# Patient Record
Sex: FEMALE | Race: WHITE | Employment: UNEMPLOYED | URBAN - METROPOLITAN AREA
[De-identification: names, ages, dates, MRNs, and addresses within clinical notes are randomized per-mention and may not be internally consistent; named-entity substitution may affect disease eponyms.]

---

## 2019-01-28 ENCOUNTER — HOSPITAL ENCOUNTER (EMERGENCY)
Age: 29
Discharge: HOME | End: 2019-01-28
Attending: EMERGENCY MEDICINE
Payer: SELF-PAY

## 2019-01-28 VITALS
OXYGEN SATURATION: 100 % | DIASTOLIC BLOOD PRESSURE: 84 MMHG | SYSTOLIC BLOOD PRESSURE: 131 MMHG | TEMPERATURE: 98.9 F | RESPIRATION RATE: 16 BRPM | HEART RATE: 87 BPM

## 2019-01-28 DIAGNOSIS — J02.9 ACUTE PHARYNGITIS, UNSPECIFIED ETIOLOGY: Primary | ICD-10-CM

## 2019-01-28 PROCEDURE — 87081 CULTURE SCREEN ONLY: CPT

## 2019-01-28 PROCEDURE — 74011000250 HC RX REV CODE- 250: Performed by: EMERGENCY MEDICINE

## 2019-01-28 PROCEDURE — 99283 EMERGENCY DEPT VISIT LOW MDM: CPT

## 2019-01-28 PROCEDURE — 74011636637 HC RX REV CODE- 636/637: Performed by: EMERGENCY MEDICINE

## 2019-01-28 PROCEDURE — 74011250637 HC RX REV CODE- 250/637: Performed by: EMERGENCY MEDICINE

## 2019-01-28 RX ORDER — PREDNISOLONE 15 MG/5ML
60 SOLUTION ORAL DAILY
Status: DISCONTINUED | OUTPATIENT
Start: 2019-01-28 | End: 2019-01-28

## 2019-01-28 RX ORDER — PREDNISONE 20 MG/1
60 TABLET ORAL DAILY
Qty: 15 TAB | Refills: 0 | Status: SHIPPED | OUTPATIENT
Start: 2019-01-28 | End: 2019-02-02

## 2019-01-28 RX ORDER — PREDNISOLONE 15 MG/5ML
60 SOLUTION ORAL
Status: COMPLETED | OUTPATIENT
Start: 2019-01-28 | End: 2019-01-28

## 2019-01-28 RX ADMIN — PREDNISOLONE 60 MG: 15 SOLUTION ORAL at 04:35

## 2019-01-28 RX ADMIN — LIDOCAINE HYDROCHLORIDE 50 ML: 20 SOLUTION ORAL; TOPICAL at 04:35

## 2019-01-28 NOTE — DISCHARGE INSTRUCTIONS
Sore Throat: Care Instructions  Your Care Instructions    Infection by bacteria or a virus causes most sore throats. Cigarette smoke, dry air, air pollution, allergies, and yelling can also cause a sore throat. Sore throats can be painful and annoying. Fortunately, most sore throats go away on their own. If you have a bacterial infection, your doctor may prescribe antibiotics. Follow-up care is a key part of your treatment and safety. Be sure to make and go to all appointments, and call your doctor if you are having problems. It's also a good idea to know your test results and keep a list of the medicines you take. How can you care for yourself at home? · If your doctor prescribed antibiotics, take them as directed. Do not stop taking them just because you feel better. You need to take the full course of antibiotics. · Gargle with warm salt water once an hour to help reduce swelling and relieve discomfort. Use 1 teaspoon of salt mixed in 1 cup of warm water. · Take an over-the-counter pain medicine, such as acetaminophen (Tylenol), ibuprofen (Advil, Motrin), or naproxen (Aleve). Read and follow all instructions on the label. · Be careful when taking over-the-counter cold or flu medicines and Tylenol at the same time. Many of these medicines have acetaminophen, which is Tylenol. Read the labels to make sure that you are not taking more than the recommended dose. Too much acetaminophen (Tylenol) can be harmful. · Drink plenty of fluids. Fluids may help soothe an irritated throat. Hot fluids, such as tea or soup, may help decrease throat pain. · Use over-the-counter throat lozenges to soothe pain. Regular cough drops or hard candy may also help. These should not be given to young children because of the risk of choking. · Do not smoke or allow others to smoke around you. If you need help quitting, talk to your doctor about stop-smoking programs and medicines.  These can increase your chances of quitting for good.  · Use a vaporizer or humidifier to add moisture to your bedroom. Follow the directions for cleaning the machine. When should you call for help? Call your doctor now or seek immediate medical care if:    · You have new or worse trouble swallowing.     · Your sore throat gets much worse on one side.    Watch closely for changes in your health, and be sure to contact your doctor if you do not get better as expected. Where can you learn more? Go to http://reg-redd.info/. Enter [phone] [id number] in the search box to learn more about \"Sore Throat: Care Instructions. \"  Current as of: March 27, 2018  Content Version: 11.9  © 2493-5119 [employer]. Care instructions adapted under license by [employer] (which disclaims liability or warranty for this information). If you have questions about a medical condition or this instruction, always ask your healthcare professional. [address] any warranty or liability for your use of this information.

## 2019-01-28 NOTE — ED PROVIDER NOTES
EMERGENCY DEPARTMENT HISTORY AND PHYSICAL EXAM 
 
Date: 1/28/2019 Patient Name: Bertram Kaye History of Presenting Illness Chief Complaint Patient presents with  Sore Throat History Provided By: Patient Chief Complaint: Sore Throat Duration: 3 Days Timing:  Acute Location: Throat Quality: Sore Severity: Mild Modifying Factors: No modifying factors Associated Symptoms: Congestion Additional History (Context):  
4:16 AM 
Bertram Kaye is a 33 y.o. female with PMHX of Hypothyroidism who presents to the emergency department C/O sore throat onset 3 days ago. Associated sxs include congestion. Pt endorses sick contact. Pt states that her sxs started with the sore throat. Pt states she has a hx of sinus congestion. Pt denies postnasal drip, fever, EtOH use, tobacco use, illicit drug use, chills hx of asthma, and any other sxs or complaints. PCP: None Past History Past Medical History: 
Past Medical History:  
Diagnosis Date  Hypothyroidism Past Surgical History: 
History reviewed. No pertinent surgical history. Family History: 
History reviewed. No pertinent family history. Social History: 
Social History Tobacco Use  Smoking status: Never Smoker Substance Use Topics  Alcohol use: No  
  Frequency: Never  Drug use: Not on file Allergies: 
No Known Allergies Review of Systems Review of Systems Constitutional: Negative for chills and fever. HENT: Positive for congestion and sore throat. Negative for postnasal drip. All other systems reviewed and are negative. Physical Exam  
 
Vitals:  
 01/28/19 0334 BP: 131/84 Pulse: 87 Resp: 16 Temp: 98.9 °F (37.2 °C) SpO2: 100% Physical Exam  
Constitutional: She is oriented to person, place, and time. She appears well-developed and well-nourished. No distress. Well appearing nontoxic HENT:  
Head: Normocephalic and atraumatic.   
Right Ear: External ear normal.  
 Left Ear: External ear normal.  
Mouth/Throat: Oropharynx is clear and moist. No oropharyngeal exudate. Sinuses erythema and edema No purulence No throat erythema No exudate Eyes: Conjunctivae and EOM are normal. Pupils are equal, round, and reactive to light. No scleral icterus. No pallor Neck: Normal range of motion. Neck supple. No JVD present. No tracheal deviation present. No thyromegaly present. Cardiovascular: Normal rate, regular rhythm and normal heart sounds. Pulmonary/Chest: Effort normal and breath sounds normal. No stridor. No respiratory distress. Abdominal: Soft. Bowel sounds are normal. She exhibits no distension. There is no tenderness. There is no rebound and no guarding. Musculoskeletal: Normal range of motion. She exhibits no edema or tenderness. No soft tissue injuries Lymphadenopathy:  
  She has no cervical adenopathy. Neurological: She is alert and oriented to person, place, and time. She has normal reflexes. No cranial nerve deficit. Coordination normal.  
Skin: Skin is warm and dry. No rash noted. She is not diaphoretic. No erythema. Psychiatric: She has a normal mood and affect. Her behavior is normal. Judgment and thought content normal.  
Nursing note and vitals reviewed. Diagnostic Study Results Labs - Recent Results (from the past 12 hour(s)) STREP THROAT SCREEN Collection Time: 01/28/19  3:37 AM  
Result Value Ref Range Special Requests: NO SPECIAL REQUESTS Strep Screen NEGATIVE Strep Screen (NOTE) RAPID PERFORMED BY [id number] Culture result: PENDING Radiologic Studies - No orders to display CT Results  (Last 48 hours) None CXR Results  (Last 48 hours) None Medications given in the ED- Medications  
prednisoLONE (PRELONE) syrup 60 mg (not administered)  
mylanta/viscous lidocaine (GI COCKTAIL) (not administered) Medical Decision Making I am the first provider for this patient. I reviewed the vital signs, available nursing notes, past medical history, past surgical history, family history and social history. Vital Signs-Reviewed the patient's vital signs. Pulse Oximetry Analysis - 100% on RA Records Reviewed: Nursing Notes and Old Medical Records Provider Notes (Medical Decision Making): Ddx: pharyngitis without signs of strep epiglottitis, or other airway compromise. Will treat sxs with steroids, GI cocktail, recommend zinc and follow up as needed. Procedures: 
Procedures ED Course:  
4:16 AM Initial assessment performed. The patients presenting problems have been discussed, and they are in agreement with the care plan formulated and outlined with them. I have encouraged them to ask questions as they arise throughout their visit. Diagnosis and Disposition DISCHARGE NOTE: 
 
Maribel Ashger  results have been reviewed with her. She has been counseled regarding her diagnosis, treatment, and plan. She verbally conveys understanding and agreement of the signs, symptoms, diagnosis, treatment and prognosis and additionally agrees to follow up as discussed. She also agrees with the care-plan and conveys that all of her questions have been answered. I have also provided discharge instructions for her that include: educational information regarding their diagnosis and treatment, and list of reasons why they would want to return to the ED prior to their follow-up appointment, should her condition change. She has been provided with education for proper emergency department utilization. CLINICAL IMPRESSION: 
 
1. Acute pharyngitis, unspecified etiology PLAN: 
1. D/C Home 2. Current Discharge Medication List  
  
START taking these medications Details  
magic mouthwash (PHILL) susp Take 5 mL by mouth every four (4) hours as needed. Maalox, benadryl, lidocaine - NO NYSTATIN Qty: 120 mL, Refills: 0 predniSONE (DELTASONE) 20 mg tablet Take 60 mg by mouth daily for 5 days. With Breakfast 
Qty: 15 Tab, Refills: 0  
  
vit a-vit c-zinc-propolis (ZINC, WITH A AND C, LOZENGES) lozg Use lozenges as needed 
Qty: 30 Lozenge, Refills: 0  
  
  
 
3. Follow-up Information Follow up With Specialties Details Why Contact Info [facility]  Schedule an appointment as soon as possible for a visit For primary care follow up  [address] [place], [address] [place]  [address] 
[phone] [facility] EMERGENCY DEPT Emergency Medicine Go to As needed, if symptoms worsen [address] [place]  [address] 
[phone]  
  
 
_______________________________ Attestations: This note is prepared by Justa Moore, acting as Scribe for Atul. Zev Baires MD. Atul. Zev Baires MD:  The scribe's documentation has been prepared under my direction and personally reviewed by me in its entirety. I confirm that the note above accurately reflects all work, treatment, procedures, and medical decision making performed by me. 
_______________________________

## 2019-01-30 LAB
B-HEM STREP THROAT QL CULT: NEGATIVE
B-HEM STREP THROAT QL CULT: NORMAL
BACTERIA SPEC CULT: NORMAL
SERVICE CMNT-IMP: NORMAL

## 2019-05-13 ENCOUNTER — HOSPITAL ENCOUNTER (OUTPATIENT)
Dept: WOUND CARE | Age: 29
Discharge: HOME | End: 2019-05-13
Attending: FAMILY MEDICINE
Payer: COMMERCIAL

## 2019-05-13 PROBLEM — T81.31XA DEHISCENCE OF EXTERNAL SURGICAL WOUND: Status: ACTIVE | Noted: 2019-05-13

## 2019-05-13 PROBLEM — T81.31XA: Status: ACTIVE | Noted: 2019-05-13

## 2019-05-13 PROBLEM — Z98.890 STATUS POST ABDOMINOPLASTY: Status: ACTIVE | Noted: 2019-05-13

## 2019-05-13 PROCEDURE — 87186 SC STD MICRODIL/AGAR DIL: CPT

## 2019-05-13 PROCEDURE — 87070 CULTURE OTHR SPECIMN AEROBIC: CPT

## 2019-05-13 PROCEDURE — 11045 DBRDMT SUBQ TISS EACH ADDL: CPT

## 2019-05-13 PROCEDURE — 87075 CULTR BACTERIA EXCEPT BLOOD: CPT

## 2019-05-13 PROCEDURE — 11042 DBRDMT SUBQ TIS 1ST 20SQCM/<: CPT

## 2019-05-13 PROCEDURE — 87077 CULTURE AEROBIC IDENTIFY: CPT

## 2019-05-13 NOTE — PROGRESS NOTES
Patient presents to wound clinic for: Beverly Blackwell is a 33 y.o. female who presents initial consult for wound care. Pt had a tummy tuck done in the [facility] in April. Postoperatively she was doing well until about 3 weeks ago when the incision started to breakdown. 2 weeks ago she went to [facility] and was started on Bactrim and Keflex and she has finished that. The wound continues to break down and drains. She is using medical honey to assist in keeping it clean. She denies pain. Pertinent Medical History:  Past Medical History:   Diagnosis Date    Hypothyroidism      No past surgical history on file. Prior to Admission medications    Medication Sig Start Date End Date Taking? Authorizing Provider   magic mouthwash (PHILL) susp Take 5 mL by mouth every four (4) hours as needed. Maalox, benadryl, lidocaine - NO NYSTATIN 1/28/19   Jocelyn Cohen MD   vit a-vit c-zinc-propolis (ZINC, WITH A AND C, LOZENGES) lozg Use lozenges as needed 1/28/19   Jocelyn Cohen MD     No Known Allergies  No family history on file.   Social History     Socioeconomic History    Marital status:      Spouse name: Not on file    Number of children: Not on file    Years of education: Not on file    Highest education level: Not on file   Occupational History    Not on file   Social Needs    Financial resource strain: Not on file    Food insecurity:     Worry: Not on file     Inability: Not on file    Transportation needs:     Medical: Not on file     Non-medical: Not on file   Tobacco Use    Smoking status: Never Smoker   Substance and Sexual Activity    Alcohol use: No     Frequency: Never    Drug use: Not on file    Sexual activity: Not on file   Lifestyle    Physical activity:     Days per week: Not on file     Minutes per session: Not on file    Stress: Not on file   Relationships    Social connections:     Talks on phone: Not on file     Gets together: Not on file     Attends [employer] service: Not on file     Active member of club or organization: Not on file     Attends meetings of clubs or organizations: Not on file     Relationship status: Not on file    Intimate partner violence:     Fear of current or ex partner: Not on file     Emotionally abused: Not on file     Physically abused: Not on file     Forced sexual activity: Not on file   Other Topics Concern    Not on file   Social History Narrative    Not on file     Review of Systems:    Gen: No fever, chills, malaise, weight loss/gain. Heent: No headache, rhinorrhea, epistaxis, ear pain, hearing loss, sinus pain, neck pain/stiffness, sore throat. Heart: No chest pain, palpitations, RAZA, pnd, or orthopnea. Resp: No cough, hemoptysis, wheezing and shortness of breath. GI: No nausea, vomiting, diarrhea, constipation, melena or hematochezia. : No urinary obstruction, dysuria or hematuria. Derm: see below  Musc/skeletal: no bone or joint complains. Vasc: No edema, cyanosis or claudication. Endo: No heat/cold intolerance, no polyuria,polydipsia or polyphagia. Neuro: No unilateral weakness, numbness, tingling. No seizures. Heme: No easy bruising or bleeding. Exam:    Vitals:    05/13/19 1409   BP: 132/89   Pulse: 79   Resp: 15   Temp: 98.4 °F (36.9 °C)   SpO2: 98%   Weight: 99.8 kg (220 lb)   Height: 5' 6\" (1.676 m)     Gen Overweight, pleasant female in NAD. CV RRR  Lungs CTA bilaterally. Neuro: A+Ox4, Gross motor intact. Psych: Pleasant, good hygiene, no anxiety or depression.       Wound Description and Procedure Note:  05/14/19 1521    Wound Abdomen   Date First Assessed/Time First Assessed: 05/14/19 1519   Present on Hospital Admission: Yes  Wound Approximate Age at First Assessment (Weeks): 4 weeks  Primary Wound Type: Open incision/surgical site  Location: Abdomen   Dressing Status Breakthrough drainage   Dressing Type ABD binder;ABD pad   Incision Site Well Approximated No   Non-staged Wound Description Full thickness   Shape irregular   Wound Length (cm) 20 cm   Wound Width (cm) 6 cm   Wound Depth (cm) 0.5 cm   Wound Volume (cm^3) 60 cm^3   Condition of Base Slough;Granulation; Adipose exposed   Condition of Edges Open   Assessment Painful   Tissue Type Percent Red 30   Tissue Type Percent Yellow 70   Drainage Amount Large   Drainage Color Serosanguinous   Wound Odor None   Samantha-wound Assessment Induration   Margins Epibole (rolled edges)   Cleansing and Cleansing Agents  Dermal wound cleanser   Dressing Changed Changed/New   Dressing Type Applied Negative pressure wound therapy   Wound Procedure Type Remove Slough   Procedure Time Out 1445   Consent Obtained  Yes   Procedure Bleeding Moderate   Procedure Hemostasis  Pressure   Type of Tissue Removed  Necrotic Tissue /Eschar;Devitalized   Procedure Instrument  Curette;Scissors; Forceps   Procedure Pain Scale Numeric 5/10   Debridement Procedure Performed by Dr. Cookie Mack   Post-Procedure Length (cm) 7 cm   Post-Procedure Width (cm) 21 cm   Post-Procedure Depth (cm) 0.7 cm   Post-Procedure Volume (cm^3) 102.9 cm^3   Post-Procedure Surface Area (cm^2) 147 cm^2   Closure None   Procedure Tolerated Well     Tissue Removed: exudate, dermis, epidermis, subcutaneous     Debridement: required today to promote wound healing. Assessment:  Patient Active Problem List   Diagnosis Code    Status post abdominoplasty Z98.890    Dehiscence of closure of subcutaneous tissue, initial encounter T81.31XA    Dehiscence of external surgical wound T81. 31XA         Plan:  1. Debridement as required. 2. Wound vacuum application. 3. Wound cultures. 4. RTC in 1 week.     Jorge Floyd MD

## 2019-05-14 VITALS
OXYGEN SATURATION: 98 % | HEIGHT: 66 IN | TEMPERATURE: 98.4 F | RESPIRATION RATE: 15 BRPM | BODY MASS INDEX: 35.36 KG/M2 | WEIGHT: 220 LBS | SYSTOLIC BLOOD PRESSURE: 132 MMHG | HEART RATE: 79 BPM | DIASTOLIC BLOOD PRESSURE: 89 MMHG

## 2019-05-14 NOTE — DISCHARGE INSTRUCTIONS
Leave dressings in place until next visit    Patient to return for wound care in:  Days    PLEASE CONTACT OFFICE AS SOON AS POSSIBLE IF UNABLE TO MAKE THIS APPOINTMENT. Inspect your wounds, looking for signs of infection which may include the following:  Increase in redness  Red \"streaks\" from wound  Increase in swelling  Fever  Unusual odor  Change in the amount of wound drainage. Should you experience any significant changes in your wound(s) or have any questions regarding your home care instructions please contact the wound center or your home health company. If after regular business hours, please call your family doctor or local emergency room. Edema Control: Elevate legs as much as possible. Avoid standing in one position for more than 10 minutes. Avoid setting with legs down. Do not cross legs while sitting. Off-Loading:Frequent position changes. Do not cross legs while sitting. Shift weight every 20 minutes or more when sitting for prolonged periods of time.

## 2019-05-14 NOTE — WOUND CARE
05/14/19 1521 Wound Abdomen Date First Assessed/Time First Assessed: 05/14/19 1519   Present on Hospital Admission: Yes  Wound Approximate Age at First Assessment (Weeks): 4 weeks  Primary Wound Type: Open incision/surgical site  Location: Abdomen Dressing Status Breakthrough drainage Dressing Type ABD binder;ABD pad Incision Site Well Approximated No  
Non-staged Wound Description Full thickness Shape irregular Wound Length (cm) 20 cm Wound Width (cm) 6 cm Wound Depth (cm) 0.5 cm Wound Volume (cm^3) 60 cm^3 Condition of Base Slough;Granulation; Adipose exposed Condition of Edges Open Assessment Painful Tissue Type Percent Red 30 Tissue Type Percent Yellow 70 Drainage Amount Large Drainage Color Serosanguinous Wound Odor None Samantha-wound Assessment Induration Margins Epibole (rolled edges) Cleansing and Cleansing Agents  Dermal wound cleanser Dressing Changed Changed/New Dressing Type Applied Negative pressure wound therapy Wound Procedure Type Remove [facility] Procedure Time Out 0198 Consent Obtained  Yes Procedure Bleeding Moderate Procedure Hemostasis  Pressure Type of Tissue Removed  Necrotic Tissue /Eschar;Devitalized Procedure Instrument  Curette;Scissors; Forceps Procedure Pain Scale Numeric 5/10 Debridement Procedure Performed by Dr. Emily Bedoya Post-Procedure Length (cm) 7 cm Post-Procedure Width (cm) 21 cm Post-Procedure Depth (cm) 0.7 cm Post-Procedure Volume (cm^3) 102.9 cm^3 Post-Procedure Surface Area (cm^2) 147 cm^2 Closure None Procedure Tolerated Well

## 2019-05-15 LAB
BACTERIA SPEC CULT: ABNORMAL
GRAM STN SPEC: ABNORMAL
SERVICE CMNT-IMP: ABNORMAL
SERVICE CMNT-IMP: ABNORMAL

## 2019-05-16 ENCOUNTER — HOSPITAL ENCOUNTER (OUTPATIENT)
Dept: WOUND CARE | Age: 29
Discharge: HOME | End: 2019-05-16
Attending: FAMILY MEDICINE
Payer: COMMERCIAL

## 2019-05-16 VITALS
OXYGEN SATURATION: 100 % | TEMPERATURE: 98.1 F | DIASTOLIC BLOOD PRESSURE: 81 MMHG | RESPIRATION RATE: 16 BRPM | SYSTOLIC BLOOD PRESSURE: 129 MMHG | HEART RATE: 99 BPM

## 2019-05-16 PROCEDURE — 99211 OFF/OP EST MAY X REQ PHY/QHP: CPT

## 2019-05-16 RX ORDER — SULFAMETHOXAZOLE AND TRIMETHOPRIM 800; 160 MG/1; MG/1
2 TABLET ORAL 2 TIMES DAILY
Qty: 40 TAB | Refills: 0 | Status: SHIPPED | OUTPATIENT
Start: 2019-05-16 | End: 2019-05-23

## 2019-05-16 RX ORDER — AMOXICILLIN AND CLAVULANATE POTASSIUM 875; 125 MG/1; MG/1
1 TABLET, FILM COATED ORAL 2 TIMES DAILY
Qty: 20 TAB | Refills: 0 | Status: SHIPPED | OUTPATIENT
Start: 2019-05-16 | End: 2019-05-23

## 2019-05-16 NOTE — PROGRESS NOTES
Patient presents to wound clinic for: Ginger Buchanan is a 33 y.o. female whom presents for change of the wound vac. She has no complaints today. Review of Systems: 
 
Gen: No fever, chills, malaise, weight loss/gain. GI: No nausea, vomiting, diarrhea, constipation, melena or hematochezia. Derm: see below Musc/skeletal: no bone or joint complains. Vasc: No edema, cyanosis or claudication. Exam:  
Gen: Pleasant. No obvious distress. Wound vac in place. Good drainage. Wound Description: Not measured today due to wound vac in place. Infection: Yes Type: MRSA, MADIE Grover Signs and Symptoms: Skin breakdowna Recent Would Culture results: as above Antibiotic: 
 Oral: start Name: Bactrim and Augmentin Assessment: 
 
Patient Active Problem List  
Diagnosis Code  Status post abdominoplasty Z98.890  
 Dehiscence of closure of subcutaneous tissue, initial encounter T81.31XA  Dehiscence of external surgical wound T81. 31XA MRSA in the wound. Plan: 1. Continue wound vac until Monday. 2. Start Bactrim and Augmentin for positive wound cultures. 3. Debridement on Monday.  
 
Zandra Pozo MD

## 2019-05-17 ENCOUNTER — APPOINTMENT (OUTPATIENT)
Dept: GENERAL RADIOLOGY | Age: 29
DRG: 862 | End: 2019-05-17
Attending: EMERGENCY MEDICINE
Payer: COMMERCIAL

## 2019-05-17 ENCOUNTER — HOSPITAL ENCOUNTER (INPATIENT)
Age: 29
LOS: 6 days | Discharge: HOME | DRG: 862 | End: 2019-05-23
Attending: EMERGENCY MEDICINE | Admitting: FAMILY MEDICINE
Payer: COMMERCIAL

## 2019-05-17 DIAGNOSIS — T14.8XXA INFECTED WOUND: Primary | ICD-10-CM

## 2019-05-17 DIAGNOSIS — L08.9 INFECTED WOUND: Primary | ICD-10-CM

## 2019-05-17 DIAGNOSIS — R65.10 SIRS (SYSTEMIC INFLAMMATORY RESPONSE SYNDROME) (HCC): ICD-10-CM

## 2019-05-17 DIAGNOSIS — A49.02 MRSA INFECTION: ICD-10-CM

## 2019-05-17 LAB
ALBUMIN SERPL-MCNC: 3.7 G/DL (ref 3.4–5)
ALBUMIN/GLOB SERPL: 0.9 {RATIO} (ref 0.8–1.7)
ALP SERPL-CCNC: 85 U/L (ref 45–117)
ALT SERPL-CCNC: 58 U/L (ref 13–56)
ANION GAP SERPL CALC-SCNC: 9 MMOL/L (ref 3–18)
APPEARANCE UR: ABNORMAL
AST SERPL-CCNC: 35 U/L (ref 15–37)
BACTERIA URNS QL MICRO: ABNORMAL /HPF
BASOPHILS # BLD: 0 K/UL (ref 0–0.1)
BASOPHILS NFR BLD: 0 % (ref 0–2)
BILIRUB SERPL-MCNC: 0.8 MG/DL (ref 0.2–1)
BILIRUB UR QL: ABNORMAL
BUN SERPL-MCNC: 8 MG/DL (ref 7–18)
BUN/CREAT SERPL: 8 (ref 12–20)
CALCIUM SERPL-MCNC: 9.3 MG/DL (ref 8.5–10.1)
CHLORIDE SERPL-SCNC: 99 MMOL/L (ref 100–108)
CO2 SERPL-SCNC: 26 MMOL/L (ref 21–32)
COLOR UR: ABNORMAL
CREAT SERPL-MCNC: 1.02 MG/DL (ref 0.6–1.3)
DIFFERENTIAL METHOD BLD: ABNORMAL
EOSINOPHIL # BLD: 0.2 K/UL (ref 0–0.4)
EOSINOPHIL NFR BLD: 2 % (ref 0–5)
EPITH CASTS URNS QL MICRO: ABNORMAL /LPF (ref 0–5)
ERYTHROCYTE [DISTWIDTH] IN BLOOD BY AUTOMATED COUNT: 12.8 % (ref 11.6–14.5)
GLOBULIN SER CALC-MCNC: 4.1 G/DL (ref 2–4)
GLUCOSE SERPL-MCNC: 118 MG/DL (ref 74–99)
GLUCOSE UR STRIP.AUTO-MCNC: NEGATIVE MG/DL
HCT VFR BLD AUTO: 40.7 % (ref 35–45)
HGB BLD-MCNC: 13.1 G/DL (ref 12–16)
HGB UR QL STRIP: NEGATIVE
KETONES UR QL STRIP.AUTO: ABNORMAL MG/DL
LACTATE BLD-SCNC: 0.73 MMOL/L (ref 0.4–2)
LEUKOCYTE ESTERASE UR QL STRIP.AUTO: ABNORMAL
LYMPHOCYTES # BLD: 1 K/UL (ref 0.9–3.6)
LYMPHOCYTES NFR BLD: 7 % (ref 21–52)
MCH RBC QN AUTO: 26.6 PG (ref 24–34)
MCHC RBC AUTO-ENTMCNC: 32.2 G/DL (ref 31–37)
MCV RBC AUTO: 82.7 FL (ref 74–97)
MONOCYTES # BLD: 0.7 K/UL (ref 0.05–1.2)
MONOCYTES NFR BLD: 5 % (ref 3–10)
NEUTS SEG # BLD: 12.3 K/UL (ref 1.8–8)
NEUTS SEG NFR BLD: 86 % (ref 40–73)
NITRITE UR QL STRIP.AUTO: NEGATIVE
PH UR STRIP: 5.5 [PH] (ref 5–8)
PLATELET # BLD AUTO: 481 K/UL (ref 135–420)
PMV BLD AUTO: 9.3 FL (ref 9.2–11.8)
POTASSIUM SERPL-SCNC: 3.7 MMOL/L (ref 3.5–5.5)
PROT SERPL-MCNC: 7.8 G/DL (ref 6.4–8.2)
PROT UR STRIP-MCNC: NEGATIVE MG/DL
RBC # BLD AUTO: 4.92 M/UL (ref 4.2–5.3)
RBC #/AREA URNS HPF: NEGATIVE /HPF (ref 0–5)
SODIUM SERPL-SCNC: 134 MMOL/L (ref 136–145)
SP GR UR REFRACTOMETRY: 1.03 (ref 1–1.03)
URATE CRY URNS QL MICRO: ABNORMAL
UROBILINOGEN UR QL STRIP.AUTO: 1 EU/DL (ref 0.2–1)
WBC # BLD AUTO: 14.2 K/UL (ref 4.6–13.2)
WBC URNS QL MICRO: ABNORMAL /HPF (ref 0–5)

## 2019-05-17 PROCEDURE — 65270000029 HC RM PRIVATE

## 2019-05-17 PROCEDURE — 74011250637 HC RX REV CODE- 250/637: Performed by: EMERGENCY MEDICINE

## 2019-05-17 PROCEDURE — 93005 ELECTROCARDIOGRAM TRACING: CPT

## 2019-05-17 PROCEDURE — 74011250636 HC RX REV CODE- 250/636: Performed by: EMERGENCY MEDICINE

## 2019-05-17 PROCEDURE — 80053 COMPREHEN METABOLIC PANEL: CPT

## 2019-05-17 PROCEDURE — 96366 THER/PROPH/DIAG IV INF ADDON: CPT

## 2019-05-17 PROCEDURE — 85025 COMPLETE CBC W/AUTO DIFF WBC: CPT

## 2019-05-17 PROCEDURE — 83605 ASSAY OF LACTIC ACID: CPT

## 2019-05-17 PROCEDURE — 71045 X-RAY EXAM CHEST 1 VIEW: CPT

## 2019-05-17 PROCEDURE — 96375 TX/PRO/DX INJ NEW DRUG ADDON: CPT

## 2019-05-17 PROCEDURE — 99285 EMERGENCY DEPT VISIT HI MDM: CPT

## 2019-05-17 PROCEDURE — 87040 BLOOD CULTURE FOR BACTERIA: CPT

## 2019-05-17 PROCEDURE — 81001 URINALYSIS AUTO W/SCOPE: CPT

## 2019-05-17 PROCEDURE — 77030032490 HC SLV COMPR SCD KNE COVD -B

## 2019-05-17 PROCEDURE — 96365 THER/PROPH/DIAG IV INF INIT: CPT

## 2019-05-17 RX ORDER — ONDANSETRON 2 MG/ML
4 INJECTION INTRAMUSCULAR; INTRAVENOUS
Status: COMPLETED | OUTPATIENT
Start: 2019-05-17 | End: 2019-05-17

## 2019-05-17 RX ORDER — VANCOMYCIN/0.9 % SOD CHLORIDE 1.5G/250ML
1500 PLASTIC BAG, INJECTION (ML) INTRAVENOUS ONCE
Status: COMPLETED | OUTPATIENT
Start: 2019-05-17 | End: 2019-05-17

## 2019-05-17 RX ORDER — ACETAMINOPHEN 325 MG/1
650 TABLET ORAL
Status: COMPLETED | OUTPATIENT
Start: 2019-05-17 | End: 2019-05-17

## 2019-05-17 RX ORDER — HEPARIN SODIUM 5000 [USP'U]/ML
5000 INJECTION, SOLUTION INTRAVENOUS; SUBCUTANEOUS EVERY 8 HOURS
Status: DISCONTINUED | OUTPATIENT
Start: 2019-05-17 | End: 2019-05-19

## 2019-05-17 RX ORDER — MORPHINE SULFATE 4 MG/ML
4 INJECTION INTRAVENOUS
Status: COMPLETED | OUTPATIENT
Start: 2019-05-17 | End: 2019-05-17

## 2019-05-17 RX ORDER — VANCOMYCIN/0.9 % SOD CHLORIDE 1.5G/250ML
1500 PLASTIC BAG, INJECTION (ML) INTRAVENOUS EVERY 12 HOURS
Status: DISCONTINUED | OUTPATIENT
Start: 2019-05-18 | End: 2019-05-20

## 2019-05-17 RX ORDER — SODIUM CHLORIDE 9 MG/ML
125 INJECTION, SOLUTION INTRAVENOUS CONTINUOUS
Status: DISCONTINUED | OUTPATIENT
Start: 2019-05-17 | End: 2019-05-22

## 2019-05-17 RX ADMIN — MORPHINE SULFATE 4 MG: 4 INJECTION INTRAVENOUS at 20:35

## 2019-05-17 RX ADMIN — ACETAMINOPHEN 650 MG: 325 TABLET ORAL at 21:07

## 2019-05-17 RX ADMIN — SODIUM CHLORIDE 1000 ML: 900 INJECTION, SOLUTION INTRAVENOUS at 20:00

## 2019-05-17 RX ADMIN — VANCOMYCIN HYDROCHLORIDE 1500 MG: 10 INJECTION, POWDER, LYOPHILIZED, FOR SOLUTION INTRAVENOUS at 20:28

## 2019-05-17 RX ADMIN — ONDANSETRON 4 MG: 2 INJECTION INTRAMUSCULAR; INTRAVENOUS at 20:29

## 2019-05-17 NOTE — ED PROVIDER NOTES
EMERGENCY DEPARTMENT HISTORY AND PHYSICAL EXAM 
 
Date: 5/17/2019 Patient Name: Tamara Barrios History of Presenting Illness Chief Complaint Patient presents with  Abscess  Generalized Body Aches HPI:  
7:28 PM 
Tamara Barrios is a 33 y.o. female with PMHX of thyroid disease, who presents to the emergency department C/O chills, body aches, and drainage from abdomen wound. Patient states she had a tummy tack, and umbilical hernia repair in [place] for fall 2019. She was seen by wound care and had wound VAC applied about a week ago. Last few days she developed small abscess on her vaginal area. Today she began having chills and generalized aching and she noted more drainage from the wound VAC. She has had similar abscesses like in a vaginal area multiple times in the past.  She also admits to some cough and chest congestion for about a week. .. Pt denies headache, chest or abdomen pain, and any other sxs or complaints. PCP: None Current Facility-Administered Medications Medication Dose Route Frequency Provider Last Rate Last Dose  acetaminophen (TYLENOL) tablet 650 mg  650 mg Oral Q6H PRN Yusra Terry MD   650 mg at 05/18/19 2106  lidocaine 4 % patch 1 Patch  1 Patch TransDERmal Q24H Yusra Terry MD   1 Patch at 05/19/19 0629  
 butalbital-acetaminophen-caffeine (FIORICET, ESGIC) -40 mg per tablet 2 Tab  2 Tab Oral Q6H PRN Chelo Alexander MD   2 Tab at 05/18/19 1245  traMADol (ULTRAM) tablet 50 mg  50 mg Oral Q6H PRN Yusra Terry MD   50 mg at 05/19/19 0928  
 vancomycin (VANCOCIN) 1500 mg in  ml infusion  1,500 mg IntraVENous Q12H Yusra Terry  mL/hr at 05/19/19 0834 1,500 mg at 05/19/19 9278  levothyroxine (SYNTHROID) tablet 87.5 mcg  87.5 mcg Oral Farheen BENNETT MD   87.5 mcg at 05/19/19 0617  
 0.9% sodium chloride infusion  125 mL/hr IntraVENous CONTINUOUS Jez Arias  mL/hr at 05/19/19 0726 125 mL/hr at 05/19/19 0180  Vancomycin- pharmacy to dose  1 Each Other Rx Dosing/Monitoring Jez Arias MD      
 
 
Past History Past Medical History: 
Past Medical History:  
Diagnosis Date  Hypothyroidism Past Surgical History: 
Past Surgical History:  
Procedure Laterality Date  HX HERNIA REPAIR Family History: 
History reviewed. No pertinent family history. Social History: 
Social History Tobacco Use  Smoking status: Never Smoker  Smokeless tobacco: Never Used Substance Use Topics  Alcohol use: No  
  Frequency: Never  Drug use: Not Currently Allergies: 
No Known Allergies Review of Systems Review of Systems Constitutional: Positive for chills. Genitourinary: Positive for genital sores. Skin: Positive for wound. Physical Exam  
 
Vitals:  
 05/19/19 5207 05/19/19 6960 05/19/19 0720 05/19/19 1028 BP: 113/71 124/71 122/74 122/76 Pulse: 64 75 79 72 Resp: 14  15 16 Temp: 98.4 °F (36.9 °C)  98.5 °F (36.9 °C) 98.6 °F (37 °C) SpO2: 95%  100% 99% Weight:      
Height:      
 
Physical Exam  
Constitutional: She is oriented to person, place, and time. She appears well-developed and well-nourished. No distress. Obese female in no distress. HENT:  
Head: Normocephalic and atraumatic. Right Ear: External ear normal. No swelling or tenderness. Tympanic membrane is not perforated, not erythematous and not bulging. Left Ear: External ear normal. No swelling or tenderness. Tympanic membrane is not perforated, not erythematous and not bulging. Nose: Nose normal. No mucosal edema or rhinorrhea. Right sinus exhibits no maxillary sinus tenderness and no frontal sinus tenderness. Left sinus exhibits no maxillary sinus tenderness and no frontal sinus tenderness.   
Mouth/Throat: Uvula is midline, oropharynx is clear and moist and mucous membranes are normal. No oral lesions. No trismus in the jaw. No dental abscesses or uvula swelling. No oropharyngeal exudate, posterior oropharyngeal edema, posterior oropharyngeal erythema or tonsillar abscesses. Eyes: Conjunctivae are normal. Right eye exhibits no discharge. Left eye exhibits no discharge. No scleral icterus. Neck: Normal range of motion. Neck supple. Cardiovascular: Normal rate, regular rhythm, normal heart sounds and intact distal pulses. Exam reveals no gallop and no friction rub. No murmur heard. Pulmonary/Chest: Effort normal and breath sounds normal. No accessory muscle usage. No tachypnea. No respiratory distress. She has no decreased breath sounds. She has no wheezes. She has no rhonchi. She has no rales. Abdominal: Soft. She exhibits no distension. There is no tenderness. There is a wound VAC mid suprapubic area. The wound VAC appears intact but there is a small amount of serosanguineous drainage distally to the wound VAC. Rest of the abdomen appears intact. There is no obvious induration or tenderness. Genitourinary: Vagina normal.  
Genitourinary Comments: There is a small tender firm furuncle distal right vulvar area. Musculoskeletal: Normal range of motion. She exhibits no edema or tenderness. Lymphadenopathy:  
  She has no cervical adenopathy. Neurological: She is alert and oriented to person, place, and time. No cranial nerve deficit. Coordination normal.  
Skin: Skin is warm and dry. No rash noted. She is not diaphoretic. No erythema. Psychiatric: She has a normal mood and affect. Judgment normal.  
Nursing note and vitals reviewed. Diagnostic Study Results Labs - Recent Results (from the past 12 hour(s)) CBC W/O DIFF Collection Time: 05/19/19  3:45 AM  
Result Value Ref Range WBC 7.1 4.6 - 13.2 K/uL  
 RBC 4.09 (L) 4.20 - 5.30 M/uL  
 HGB 10.9 (L) 12.0 - 16.0 g/dL HCT 34.4 (L) 35.0 - 45.0 %  MCV 84.1 74.0 - 97.0 FL  
 MCH 26.7 24.0 - 34.0 PG  
 MCHC 31.7 31.0 - 37.0 g/dL  
 RDW 12.5 11.6 - 14.5 % PLATELET 240 393 - 113 K/uL MPV 10.0 9.2 - 50.8 FL  
METABOLIC PANEL, COMPREHENSIVE Collection Time: 05/19/19  3:45 AM  
Result Value Ref Range Sodium 140 136 - 145 mmol/L Potassium 3.7 3.5 - 5.5 mmol/L Chloride 109 (H) 100 - 108 mmol/L  
 CO2 24 21 - 32 mmol/L Anion gap 7 3.0 - 18 mmol/L Glucose 77 74 - 99 mg/dL BUN 5 (L) 7.0 - 18 MG/DL Creatinine 0.55 (L) 0.6 - 1.3 MG/DL  
 BUN/Creatinine ratio 9 (L) 12 - 20 GFR est AA >60 >60 ml/min/1.73m2 GFR est non-AA >60 >60 ml/min/1.73m2 Calcium 8.4 (L) 8.5 - 10.1 MG/DL Bilirubin, total 0.2 0.2 - 1.0 MG/DL  
 ALT (SGPT) 64 (H) 13 - 56 U/L  
 AST (SGOT) 35 15 - 37 U/L Alk. phosphatase 73 45 - 117 U/L Protein, total 5.8 (L) 6.4 - 8.2 g/dL Albumin 2.8 (L) 3.4 - 5.0 g/dL Globulin 3.0 2.0 - 4.0 g/dL A-G Ratio 0.9 0.8 - 1.7 Radiologic Studies -  
XR CHEST PORT Final Result IMPRESSION:  
  
No acute findings in the chest.   
  
 
CT Results  (Last 48 hours) None CXR Results  (Last 48 hours) 05/17/19 2024  XR CHEST PORT Final result Impression:  IMPRESSION:  
   
No acute findings in the chest.   
  
 Narrative:  EXAM: XR CHEST PORT  
   
CLINICAL INDICATION/HISTORY: Fever and cough  
-Additional: None COMPARISON: None TECHNIQUE: Frontal view of the chest  
   
_______________ FINDINGS:  
   
HEART AND MEDIASTINUM: Normal cardiac size and mediastinal contours. LUNGS AND PLEURAL SPACES: No focal pneumonic consolidation, pneumothorax, or  
pleural effusion. BONY THORAX AND SOFT TISSUES: No acute osseous abnormality  
   
_______________ Medications given in the ED- Medications  
vancomycin (VANCOCIN) 1500 mg in  ml infusion (1,500 mg IntraVENous New Bag 5/19/19 0897) levothyroxine (SYNTHROID) tablet 87.5 mcg (87.5 mcg Oral Given 5/19/19 0617) 0.9% sodium chloride infusion (125 mL/hr IntraVENous New Bag 5/19/19 0788) Vancomycin- pharmacy to dose (has no administration in time range)  
acetaminophen (TYLENOL) tablet 650 mg (650 mg Oral Given 5/18/19 2106) lidocaine 4 % patch 1 Patch (1 Patch TransDERmal Apply Patch 5/19/19 0611) butalbital-acetaminophen-caffeine (FIORICET, ESGIC) -40 mg per tablet 2 Tab (2 Tabs Oral Given 5/18/19 1245)  
traMADol (ULTRAM) tablet 50 mg (50 mg Oral Given 5/19/19 0928)  
sodium chloride 0.9 % bolus infusion 1,000 mL (0 mL IntraVENous IV Completed 5/17/19 2110)  
ondansetron (ZOFRAN) injection 4 mg (4 mg IntraVENous Given 5/17/19 2029) morphine injection 4 mg (4 mg IntraVENous Given 5/17/19 2035)  
vancomycin (VANCOCIN) 1500 mg in  ml infusion (0 mg IntraVENous IV Completed 5/17/19 2252)  
acetaminophen (TYLENOL) tablet 650 mg (650 mg Oral Given 5/17/19 2107) lidocaine (XYLOCAINE) 20 mg/mL (2 %) injection 40 mg (40 mg IntraDERMal Given 5/18/19 0530) Medical Decision Making I am the first provider for this patient. I reviewed the vital signs, available nursing notes, past medical history, past surgical history, family history and social history. Vital Signs-Reviewed the patient's vital signs. Pulse Oximetry Analysis - 99% on RA Cardiac Monitor: 
Rate: 72 bpm 
Rhythm: Sinus Records Reviewed: Nursing Notes and Old Medical Records Provider Notes (Medical Decision Making):  
 
Procedures: 
Procedures ED Course:  
7:28 PM Initial assessment performed. The patients presenting problems have been discussed, and they are in agreement with the care plan formulated and outlined with them. I have encouraged them to ask questions as they arise throughout their visit. CONSULT NOTE:  
9:11 PM 
Spoke with Cathi Queen Specialty: Hospitalist 
Discussed pt's hx, disposition, and available diagnostic and imaging results over the telephone. Reviewed care plans.  Consulting physician agrees with plans as outlined. Will admit. Diagnosis and Disposition Critical Care Time:  
 
Core Measures: 
For Hospitalized Patients: 
 
. Hospitalization Decision Time: The decision to hospitalize the patient was made  at 9:05 PM on 5/17/2019 2. Aspirin: Not given 10:12 PM  [place] [facility] [place] The results of their tests and reasons for their admission have been discussed with them and/or available family. They convey agreement and understanding for the need to be admitted and for their admission diagnosis. CONDITIONS ON ADMISSION: 
Sepsis is not present at the time of admission. Deep Vein Thrombosis is not present at the time of admission. Thrombosis is not present at the time of admission. Urinary Tract Infection is not present at the time of admission. Pneumonia is not present at the time of admission. MRSA is not present at the time of admission. Wound infection is present at the time of admission. Pressure Ulcer is not present at the time of admission. CLINICAL IMPRESSION: 
 
1. Infected wound 2. SIRS (systemic inflammatory response syndrome) (HCC) 3. MRSA infection PLAN: 
1. D/C Home 2. Current Discharge Medication List  
  
 
3. Follow-up Information None

## 2019-05-17 NOTE — ED TRIAGE NOTES
Pt ambulatory into ER c/o headache, back pain, vaginal abscess and body aches. Pt reports she has a wound vac on her abdomen from a tummy tuck and hernia repair on 4/4. Pt has been taking antibiotics and seeing wound care regularly but began to feel disorientated w/ aches last night.

## 2019-05-18 LAB
ALBUMIN SERPL-MCNC: 3 G/DL (ref 3.4–5)
ALBUMIN/GLOB SERPL: 0.9 {RATIO} (ref 0.8–1.7)
ALP SERPL-CCNC: 65 U/L (ref 45–117)
ALT SERPL-CCNC: 53 U/L (ref 13–56)
ANION GAP SERPL CALC-SCNC: 7 MMOL/L (ref 3–18)
AST SERPL-CCNC: 29 U/L (ref 15–37)
ATRIAL RATE: 115 BPM
BACTERIA SPEC CULT: ABNORMAL
BACTERIA SPEC CULT: ABNORMAL
BACTERIA SPEC CULT: NORMAL
BILIRUB SERPL-MCNC: 0.5 MG/DL (ref 0.2–1)
BUN SERPL-MCNC: 8 MG/DL (ref 7–18)
BUN/CREAT SERPL: 10 (ref 12–20)
CALCIUM SERPL-MCNC: 8.6 MG/DL (ref 8.5–10.1)
CALCULATED P AXIS, ECG09: 34 DEGREES
CALCULATED R AXIS, ECG10: 97 DEGREES
CALCULATED T AXIS, ECG11: -2 DEGREES
CHLORIDE SERPL-SCNC: 107 MMOL/L (ref 100–108)
CO2 SERPL-SCNC: 25 MMOL/L (ref 21–32)
CREAT SERPL-MCNC: 0.83 MG/DL (ref 0.6–1.3)
DIAGNOSIS, 93000: NORMAL
ERYTHROCYTE [DISTWIDTH] IN BLOOD BY AUTOMATED COUNT: 12.7 % (ref 11.6–14.5)
GLOBULIN SER CALC-MCNC: 3.2 G/DL (ref 2–4)
GLUCOSE SERPL-MCNC: 91 MG/DL (ref 74–99)
HCT VFR BLD AUTO: 34.9 % (ref 35–45)
HGB BLD-MCNC: 11.2 G/DL (ref 12–16)
LACTATE SERPL-SCNC: 0.6 MMOL/L (ref 0.4–2)
MCH RBC QN AUTO: 26.8 PG (ref 24–34)
MCHC RBC AUTO-ENTMCNC: 32.1 G/DL (ref 31–37)
MCV RBC AUTO: 83.5 FL (ref 74–97)
P-R INTERVAL, ECG05: 138 MS
PLATELET # BLD AUTO: 384 K/UL (ref 135–420)
PMV BLD AUTO: 9.6 FL (ref 9.2–11.8)
POTASSIUM SERPL-SCNC: 3.5 MMOL/L (ref 3.5–5.5)
PROT SERPL-MCNC: 6.2 G/DL (ref 6.4–8.2)
Q-T INTERVAL, ECG07: 314 MS
QRS DURATION, ECG06: 82 MS
QTC CALCULATION (BEZET), ECG08: 434 MS
RBC # BLD AUTO: 4.18 M/UL (ref 4.2–5.3)
SERVICE CMNT-IMP: ABNORMAL
SERVICE CMNT-IMP: NORMAL
SODIUM SERPL-SCNC: 139 MMOL/L (ref 136–145)
VENTRICULAR RATE, ECG03: 115 BPM
WBC # BLD AUTO: 9.9 K/UL (ref 4.6–13.2)

## 2019-05-18 PROCEDURE — 36415 COLL VENOUS BLD VENIPUNCTURE: CPT

## 2019-05-18 PROCEDURE — 74011250636 HC RX REV CODE- 250/636: Performed by: FAMILY MEDICINE

## 2019-05-18 PROCEDURE — 85027 COMPLETE CBC AUTOMATED: CPT

## 2019-05-18 PROCEDURE — 87116 MYCOBACTERIA CULTURE: CPT

## 2019-05-18 PROCEDURE — 74011250637 HC RX REV CODE- 250/637: Performed by: FAMILY MEDICINE

## 2019-05-18 PROCEDURE — 87077 CULTURE AEROBIC IDENTIFY: CPT

## 2019-05-18 PROCEDURE — 87186 SC STD MICRODIL/AGAR DIL: CPT

## 2019-05-18 PROCEDURE — 65270000029 HC RM PRIVATE

## 2019-05-18 PROCEDURE — 77030018836 HC SOL IRR NACL ICUM -A

## 2019-05-18 PROCEDURE — 87075 CULTR BACTERIA EXCEPT BLOOD: CPT

## 2019-05-18 PROCEDURE — 87070 CULTURE OTHR SPECIMN AEROBIC: CPT

## 2019-05-18 PROCEDURE — 80053 COMPREHEN METABOLIC PANEL: CPT

## 2019-05-18 PROCEDURE — 74011000250 HC RX REV CODE- 250: Performed by: FAMILY MEDICINE

## 2019-05-18 PROCEDURE — 74011250637 HC RX REV CODE- 250/637: Performed by: INTERNAL MEDICINE

## 2019-05-18 PROCEDURE — 83605 ASSAY OF LACTIC ACID: CPT

## 2019-05-18 RX ORDER — TRAMADOL HYDROCHLORIDE 50 MG/1
50 TABLET ORAL
Status: DISCONTINUED | OUTPATIENT
Start: 2019-05-18 | End: 2019-05-23 | Stop reason: HOSPADM

## 2019-05-18 RX ORDER — LEVOTHYROXINE SODIUM 88 UG/1
88 TABLET ORAL
COMMUNITY

## 2019-05-18 RX ORDER — ACETAMINOPHEN 325 MG/1
650 TABLET ORAL
Status: DISCONTINUED | OUTPATIENT
Start: 2019-05-18 | End: 2019-05-23 | Stop reason: HOSPADM

## 2019-05-18 RX ORDER — BUTALBITAL, ACETAMINOPHEN AND CAFFEINE 50; 325; 40 MG/1; MG/1; MG/1
2 TABLET ORAL
Status: DISCONTINUED | OUTPATIENT
Start: 2019-05-18 | End: 2019-05-23 | Stop reason: HOSPADM

## 2019-05-18 RX ORDER — LIDOCAINE 4 G/100G
1 PATCH TOPICAL EVERY 24 HOURS
Status: DISCONTINUED | OUTPATIENT
Start: 2019-05-18 | End: 2019-05-23 | Stop reason: HOSPADM

## 2019-05-18 RX ORDER — LIDOCAINE HYDROCHLORIDE 20 MG/ML
2 INJECTION, SOLUTION INFILTRATION; PERINEURAL ONCE
Status: COMPLETED | OUTPATIENT
Start: 2019-05-18 | End: 2019-05-18

## 2019-05-18 RX ADMIN — ACETAMINOPHEN 650 MG: 325 TABLET ORAL at 02:11

## 2019-05-18 RX ADMIN — VANCOMYCIN HYDROCHLORIDE 1500 MG: 10 INJECTION, POWDER, LYOPHILIZED, FOR SOLUTION INTRAVENOUS at 07:41

## 2019-05-18 RX ADMIN — VANCOMYCIN HYDROCHLORIDE 1500 MG: 10 INJECTION, POWDER, LYOPHILIZED, FOR SOLUTION INTRAVENOUS at 20:26

## 2019-05-18 RX ADMIN — HEPARIN SODIUM 5000 UNITS: 5000 INJECTION INTRAVENOUS; SUBCUTANEOUS at 16:12

## 2019-05-18 RX ADMIN — LEVOTHYROXINE SODIUM 87.5 MCG: 25 TABLET ORAL at 06:43

## 2019-05-18 RX ADMIN — ACETAMINOPHEN 650 MG: 325 TABLET ORAL at 09:10

## 2019-05-18 RX ADMIN — SODIUM CHLORIDE 125 ML/HR: 900 INJECTION, SOLUTION INTRAVENOUS at 01:09

## 2019-05-18 RX ADMIN — HEPARIN SODIUM 5000 UNITS: 5000 INJECTION INTRAVENOUS; SUBCUTANEOUS at 01:08

## 2019-05-18 RX ADMIN — BUTALBITAL, ACETAMINOPHEN AND CAFFEINE 2 TABLET: 50; 325; 40 TABLET ORAL at 12:45

## 2019-05-18 RX ADMIN — LIDOCAINE HYDROCHLORIDE 40 MG: 20 INJECTION, SOLUTION INFILTRATION; PERINEURAL at 05:30

## 2019-05-18 RX ADMIN — ACETAMINOPHEN 650 MG: 325 TABLET ORAL at 21:06

## 2019-05-18 RX ADMIN — TRAMADOL HYDROCHLORIDE 50 MG: 50 TABLET, FILM COATED ORAL at 22:33

## 2019-05-18 RX ADMIN — HEPARIN SODIUM 5000 UNITS: 5000 INJECTION INTRAVENOUS; SUBCUTANEOUS at 07:41

## 2019-05-18 RX ADMIN — SODIUM CHLORIDE 125 ML/HR: 900 INJECTION, SOLUTION INTRAVENOUS at 20:25

## 2019-05-18 NOTE — PROGRESS NOTES
0750-reeived report from [address] included SBAR MAR and Kardex. Shift summary-headache resolved with fiorecet. Vaccuum dressing unable to maintain suction due to leaks. Removed and replaced with wet to dry 4x4 and ABD pads. Foul smelling. Bedside and Verbal shift change report given to To Talbert RN (oncoming nurse) by Jono Quiñones RN (offgoing nurse). Report included the following information SBAR, Kardex and MAR.

## 2019-05-18 NOTE — PROGRESS NOTES
Shift summary: Pt transferred to floor via stretcher. Dual skin assessment completed with Claudia Kenny RN. Wound vac in place to abdomen with thick, dark red/bloody drainage; wound consult placed per protocol. Abscess to right labia without drainage. Rates pain level  8/10 to lower back and headache. IVF infusing as ordered. SCDs intact. Colleen Gallo - Dr. Bakns Gun at bedside to obtain wound cultures from abscess on right labia. 40 mg/2 ml of Lidocaine injected intradermal by MD for numbing, abscess drained and swabbed. Pt tolerated procedure well. Warm pack applied to area for comfort. 
 
0600 - Wasted 360 mg/18 ml of remaining Lidocaine with Kervin Roberts RN. Patient Vitals for the past 8 hrs: 
 Temp Pulse Resp BP SpO2  
05/18/19 0345 98.2 °F (36.8 °C) 77 18 112/62 99 % 05/17/19 2341 99.2 °F (37.3 °C) 94 18 123/61 98 %

## 2019-05-18 NOTE — PROGRESS NOTES
Pharmacy Dosing Services: Vancomycin Consult for Vancomycin Dosing by Pharmacy by Dr. Yovanny Wall Consult provided for this 33y.o. year old female , for indication of MRSA- skin and soft tissue infection. Day of Therapy 1 Ht Readings from Last 1 Encounters:  
05/17/19 167.6 cm (66\") Wt Readings from Last 1 Encounters:  
05/17/19 102.1 kg (225 lb) Significant Cultures Pending from micro lab Serum Creatinine Lab Results Component Value Date/Time Creatinine 1.02 05/17/2019 07:20 PM  
 
  
Creatinine Clearance Estimated Creatinine Clearance: 98.2 mL/min (based on SCr of 1.02 mg/dL). BUN Lab Results Component Value Date/Time BUN 8 05/17/2019 07:20 PM  
 
  
WBC Lab Results Component Value Date/Time WBC 14.2 (H) 05/17/2019 07:20 PM  
 
  
H/H Lab Results Component Value Date/Time HGB 13.1 05/17/2019 07:20 PM  
 
  
Platelets Lab Results Component Value Date/Time PLATELET 380 (H) 08/12/5312 07:20 PM  
 
  
Temp 98.7 °F (37.1 °C) Start Vancomycin therapy,   
  maintenance dose of 1500(mg) at 2028 on 05/17/2019(ER)   every 12 hours (frequency). Dose calculated to approximate a therapeutic trough of 10-20 mcg/mL. Pharmacy to follow daily and will make changes to dose and/or frequency based on clinical status. Estimated Pharmacokinetic Parameters (based on population kinetics) Vd: 71 L (0.7 L/kg) Hiram: 0.075 hr-1 (T1/2 = 9.2 hrs) Dosing Recommendations Vancomycin dose: 1500 mg IV Q12hrs (infused over 1.5 hrs) Estimated peak: 32.8 mcg/mL Estimated trough: 15.5 mcg/mL Estimated AUC:RAÚL: 559 mcg*hr/mL (assumed RAÚL 1 mcg/mL) A/P:  
1. Recommend vancomycin 1500 mg IV Q12hrs (15 mg/kg) 2. Consider a vancomycin trough level prior to the 4th dose. 3. Please monitor renal function (urine output, BUN/SCr). Dose adjustments may be necessary with a significant change in renal function.   
 
Pharmacist Leonides Alvarenga [facility]

## 2019-05-18 NOTE — H&P
History & Physical 
 
Patient: Jeanine Morin MRN: [id number]  CSN: [id number] YOB: 1990  Age: 33 y.o. Sex: female DOA: 5/17/2019 Primary Care Provider:  None Assessment/Plan Patient Active Problem List  
Diagnosis Code  Status post abdominoplasty Z98.890  
 Dehiscence of closure of subcutaneous tissue, initial encounter T81.31XA  Dehiscence of external surgical wound T81. 31XA  Wound infection T14. 8XXA, L08.9  SIRS (systemic inflammatory response syndrome) (HCC) R65.10  MRSA infection A46.200  
 
 
34 y/o female s/p recent umbilical hernia repair with abdominoplasty who has wound dehiscence and MRSA superinfection with SIRS and possible bacteremia. Will consult general surgery for evaluation. Will also place consult for wound care to help manage her wound vac. Can consider CT scan to ensure no deeper infection is present. She will be admitted for IV vancomycin as she has failed outpatient antibiotics to manage her infection. I will attempt to express R labial furuncle and can consider I/D if this does not help to drain the infection. Additionally, given how many abscesses this patient has had and the severity, I would recommend ID consultation (either inpatient or outpatient) for a MRSA decolonization procedure for both the patient and her . Estimated length of stay : 2-3 days Lyla Paulino MD 
Nocturnist 
 
ADDENDUM: 2% lido without epinephrine injected under R labial furuncle. Approx 0.5 cc of purulence was expressed. Wound cx (aerobic and anaerobic) were obtained. Heat pack applied to the area. ------------------------------------------------------------------------------------------------------------------ 
 
CC: fever/chills and wound infection HPI:  
 
Jeanine Morin is a 33 y.o. female who had an umbilical hernia repair and abdominoplasty done on 4/4/2019 in [place].  She started to have wound dehiscence about 3 weeks ago and was seen in wound clinic. She was started on Augmentin and Bactrim yesterday given a positive wound culture for MRSA. Over the past day she has felt ill with fever and chills. She also has a furuncle on her R labia causing discomfort. She has a known history of MRSA colonization with history of soft tissue infections. Past Medical History:  
Diagnosis Date  Hypothyroidism Past Surgical History:  
Procedure Laterality Date  HX HERNIA REPAIR History reviewed. No pertinent family history. Social History Socioeconomic History  Marital status:  Spouse name: Not on file  Number of children: Not on file  Years of education: Not on file  Highest education level: Not on file Tobacco Use  Smoking status: Never Smoker  Smokeless tobacco: Never Used Substance and Sexual Activity  Alcohol use: No  
  Frequency: Never  Drug use: Not Currently Prior to Admission medications Medication Sig Start Date End Date Taking? Authorizing Provider  
levothyroxine (SYNTHROID) 88 mcg tablet Take 88 mcg by mouth Daily (before breakfast). Yes Provider, Historical  
trimethoprim-sulfamethoxazole (BACTRIM DS) 160-800 mg per tablet Take 2 Tabs by mouth two (2) times a day for 10 days. 5/16/19 5/26/19 Yes Noemy Ibarra MD  
amoxicillin-clavulanate (AUGMENTIN) 875-125 mg per tablet Take 1 Tab by mouth two (2) times a day for 10 days. 5/16/19 5/26/19 Yes Noemy Ibarra MD  
magic mouthwash Dot Rizzoat) susp Take 5 mL by mouth every four (4) hours as needed. Maalox, benadryl, lidocaine - NO NYSTATIN 1/28/19   Debi Velázquez MD  
vit a-vit c-zinc-propolis (ZINC, WITH A AND C, LOZENGES) lozg Use lozenges as needed 1/28/19   Debi Velázquez MD  
 
 
No Known Allergies Review of Systems Gen: No fever, chills, malaise, weight loss/gain.   
Heent: + headache, no rhinorrhea, epistaxis, ear pain, hearing loss, sinus pain, neck pain/stiffness, sore throat. Heart: No chest pain, palpitations, RAZA, pnd, or orthopnea. Resp: No cough, hemoptysis, wheezing and shortness of breath. GI: No nausea, vomiting, diarrhea, constipation, melena or hematochezia. Derm: no rash, wound vac in place over abdomen. Musc/skeletal: no bone or joint complaints. Vasc: No edema, cyanosis or claudication. Neuro: No unilateral weakness, numbness, tingling. No seizures. Heme: No easy bruising or bleeding. Physical Exam:  
 
Physical Exam: 
Visit Vitals /61 (BP 1 Location: Left arm, BP Patient Position: At rest) Pulse 94 Temp 99.2 °F (37.3 °C) Resp 18 Ht 5' 6\" (1.676 m) Wt 102.1 kg (225 lb) LMP 2019 SpO2 98% Breastfeeding? No  
BMI 36.32 kg/m² O2 Device: Room air Temp (24hrs), Av.5 °F (37.5 °C), Min:98.7 °F (37.1 °C), Max:100.6 °F (38.1 °C) 
   1901 -  0700 In: 1500 [I.V.:1500] Out: -    No intake/output data recorded. General:  Awake, cooperative, no distress. Head:  Normocephalic, without obvious abnormality, atraumatic. No neck stiffness and able to move neck fully without pain. Eyes:  Conjunctivae/corneas clear, sclera anicteric, EOMs intact. Neck: Supple, symmetrical, trachea midline, no adenopathy. Lungs:   Clear to auscultation bilaterally. Heart:  Regular rate and rhythm, S1, S2 normal, no murmur, click, rub or gallop. Abdomen: Soft, non-tender. Bowel sounds normal. No masses,  No organomegaly. Wound vac in place over lower abdomen with bloody discharge. No obvious erythema/abscess. Extremities: Extremities normal, atraumatic, no cyanosis or edema. Capillary refill normal.  
Pulses: 2+ and symmetric all extremities. Skin: Skin color pink, turgor normal. No rashes or lesions Neurologic: No focal motor or sensory deficit. R labia with furuncle present on anterior aspect which is tender to palpation. Labs Reviewed: All lab results for the last 24 hours reviewed. Recent Results (from the past 24 hour(s)) POC LACTIC ACID Collection Time: 05/17/19  7:18 PM  
Result Value Ref Range Lactic Acid (POC) 0.73 0.40 - 2.00 mmol/L  
CBC WITH AUTOMATED DIFF Collection Time: 05/17/19  7:20 PM  
Result Value Ref Range WBC 14.2 (H) 4.6 - 13.2 K/uL  
 RBC 4.92 4.20 - 5.30 M/uL  
 HGB 13.1 12.0 - 16.0 g/dL HCT 40.7 35.0 - 45.0 % MCV 82.7 74.0 - 97.0 FL  
 MCH 26.6 24.0 - 34.0 PG  
 MCHC 32.2 31.0 - 37.0 g/dL  
 RDW 12.8 11.6 - 14.5 % PLATELET 759 (H) 496 - 420 K/uL MPV 9.3 9.2 - 11.8 FL  
 NEUTROPHILS 86 (H) 40 - 73 % LYMPHOCYTES 7 (L) 21 - 52 % MONOCYTES 5 3 - 10 % EOSINOPHILS 2 0 - 5 % BASOPHILS 0 0 - 2 %  
 ABS. NEUTROPHILS 12.3 (H) 1.8 - 8.0 K/UL  
 ABS. LYMPHOCYTES 1.0 0.9 - 3.6 K/UL  
 ABS. MONOCYTES 0.7 0.05 - 1.2 K/UL  
 ABS. EOSINOPHILS 0.2 0.0 - 0.4 K/UL  
 ABS. BASOPHILS 0.0 0.0 - 0.1 K/UL  
 DF AUTOMATED METABOLIC PANEL, COMPREHENSIVE Collection Time: 05/17/19  7:20 PM  
Result Value Ref Range Sodium 134 (L) 136 - 145 mmol/L Potassium 3.7 3.5 - 5.5 mmol/L Chloride 99 (L) 100 - 108 mmol/L  
 CO2 26 21 - 32 mmol/L Anion gap 9 3.0 - 18 mmol/L Glucose 118 (H) 74 - 99 mg/dL BUN 8 7.0 - 18 MG/DL Creatinine 1.02 0.6 - 1.3 MG/DL  
 BUN/Creatinine ratio 8 (L) 12 - 20 GFR est AA >60 >60 ml/min/1.73m2 GFR est non-AA >60 >60 ml/min/1.73m2 Calcium 9.3 8.5 - 10.1 MG/DL Bilirubin, total 0.8 0.2 - 1.0 MG/DL  
 ALT (SGPT) 58 (H) 13 - 56 U/L  
 AST (SGOT) 35 15 - 37 U/L Alk. phosphatase 85 45 - 117 U/L Protein, total 7.8 6.4 - 8.2 g/dL Albumin 3.7 3.4 - 5.0 g/dL Globulin 4.1 (H) 2.0 - 4.0 g/dL A-G Ratio 0.9 0.8 - 1.7 URINALYSIS W/ RFLX MICROSCOPIC Collection Time: 05/17/19  7:25 PM  
Result Value Ref Range Color DARK YELLOW Appearance TURBID Specific gravity 1.027 1.005 - 1.030    
 pH (UA) 5.5 5.0 - 8.0 Protein NEGATIVE  NEG mg/dL Glucose NEGATIVE  NEG mg/dL Ketone TRACE (A) NEG mg/dL Bilirubin SMALL (A) NEG Blood NEGATIVE  NEG Urobilinogen 1.0 0.2 - 1.0 EU/dL Nitrites NEGATIVE  NEG Leukocyte Esterase SMALL (A) NEG URINE MICROSCOPIC ONLY Collection Time: 05/17/19  7:25 PM  
Result Value Ref Range WBC 1 to 3 0 - 5 /hpf  
 RBC NEGATIVE  0 - 5 /hpf Epithelial cells FEW 0 - 5 /lpf Bacteria FEW (A) NEG /hpf  
 Uric acid crystals 2+ (A) NEG  
EKG, 12 LEAD, INITIAL Collection Time: 05/17/19  7:44 PM  
Result Value Ref Range Ventricular Rate 115 BPM  
 Atrial Rate 115 BPM  
 P-R Interval 138 ms QRS Duration 82 ms Q-T Interval 314 ms QTC Calculation (Bezet) 434 ms Calculated P Axis 34 degrees Calculated R Axis 97 degrees Calculated T Axis -2 degrees Diagnosis Sinus tachycardia Rightward axis T wave abnormality, consider anterolateral ischemia Abnormal ECG No previous ECGs available Date: 5/13/2019 Department: [facility] Op Wound Care Released By: Helio Ward RN (auto-released) Authorizing: Lachelle Dyson MD  
Specimen Information: Wound Drainage  
    
Component Value Flag Ref Range Units Status Special Requests:      Final  
NO SPECIAL REQUESTS   
GRAM STAIN MANY WBC'S      Final  
GRAM STAIN      Final  
MODERATE GRAM POSITIVE COCCI IN PAIRS   
GRAM STAIN      Final  
MODERATE GRAM POSITIVE COCCI IN GROUPS Culture result: Abnormal       Final  
MANY **METHICILLIN RESISTANT STAPHYLOCOCCUS AUREUS** Susceptibility Staphylococcus aureus Methcillin Resistant Antibiotic Interpretation Value Method Comment Penicillin G ($$) Resistant >=0.5 ug/mL RAÚL Clindamycin ($) Susceptible <=0.25 ug/mL RAÚL Erythromycin ($$$$) Resistant >=8 ug/mL RAÚL Gentamicin ($) Susceptible <=0.5 ug/mL RAÚL Levofloxacin ($) Susceptible 1 ug/mL RAÚL Oxacillin Resistant >=4 ug/mL RAÚL Rifampin ($$$$) Susceptible <=0.5 ug/mL RAÚL   
 Tetracycline Resistant >=16 ug/mL RAÚL Vancomycin ($) Susceptible 1 ug/mL RAÚL Trimeth-Sulfamethoxa Susceptible <=10 ug/mL RAÚL Tigecycline ($$$$) Susceptible <=0.12 ug/mL RAÚL Susceptibility Staphylococcus aureus Methcillin Resistant (1) Antibiotic Interpretation Method Status Ampicillin/sulbactam ($) Resistant RAÚL Final   
Penicillin G ($$) Resistant RAÚL Final   
Cefazolin ($) Resistant RAÚL Final   
Clindamycin ($) Susceptible RAÚL Final   
Erythromycin ($$$$) Resistant RAÚL Final   
Gentamicin ($) Susceptible RAÚL Final   
Levofloxacin ($) Susceptible RAÚL Final   
Oxacillin Resistant RAÚL Final   
Rifampin ($$$$) Susceptible RAÚL Final   
Tetracycline Resistant RAÚL Final   
Vancomycin ($) Susceptible RAÚL Final   
Trimeth-Sulfamethoxa Susceptible RAÚL Final   
Tigecycline ($$$$) Susceptible RAÚL Final   
 
Susceptibility Enterococcus  faecalis group D Antibiotic Interpretation Value Method Comment Ampicillin ($) Susceptible <=2 ug/mL RAÚL Penicillin G ($$) Susceptible 4 ug/mL RAÚL Vancomycin ($) Susceptible 1 ug/mL RAÚL Tigecycline ($$$$) Susceptible <=0.12 ug/mL RAÚL Susceptibility Enterococcus  faecalis group D (2) Antibiotic Interpretation Method Status Ampicillin ($) Susceptible RAÚL Preliminary Penicillin G ($$) Susceptible RAÚL Preliminary Gentamicin Synergy S Susceptible RAÚL Preliminary Streptomycin Synergy Susceptible RAÚL Preliminary Vancomycin ($) Susceptible RAÚL Preliminary Tigecycline ($$$$) Susceptible RAÚL Preliminary

## 2019-05-18 NOTE — ROUTINE PROCESS
TRANSFER - IN REPORT: 
 
Verbal report received from Jourdan Carrillo RN (name) on [place]  being received from ED(unit) for routine progression of care Report consisted of patients Situation, Background, Assessment and  
Recommendations(SBAR). Information from the following report(s) SBAR, Kardex, Intake/Output, MAR and Recent Results was reviewed with the receiving nurse. Opportunity for questions and clarification was provided. Assessment completed upon patients arrival to unit and care assumed.

## 2019-05-18 NOTE — PROGRESS NOTES
Admitted earlier this am.  S/p I+d. Under tx with iv abx. Reports headache and recurrent abscesses. Agree with current treatment plan. Will follow along.  
 
-Dr. Queen Hollow

## 2019-05-18 NOTE — CONSULTS
Consult Note    Patient: Lissette Guillen MRN: [id number]  CSN: [id number]    YOB: 1990  Age: 33 y.o. Sex: female    DOA: 5/17/2019 LOS:  LOS: 1 day        Requesting Physician: ED  Reason for Consultation: abd wound               HPI:     Lissette Guillen is a 33 y.o. female who has been seen for abd wound. Surgery was done in [facility]. No OP notes that I can see. Seen in [facility] clinic here. Has wound vac on. Past Medical History:   Diagnosis Date    Hypothyroidism        Past Surgical History:   Procedure Laterality Date    HX HERNIA REPAIR         History reviewed. No pertinent family history. Social History     Socioeconomic History    Marital status:      Spouse name: Not on file    Number of children: Not on file    Years of education: Not on file    Highest education level: Not on file   Tobacco Use    Smoking status: Never Smoker    Smokeless tobacco: Never Used   Substance and Sexual Activity    Alcohol use: No     Frequency: Never    Drug use: Not Currently       Prior to Admission medications    Medication Sig Start Date End Date Taking? Authorizing Provider   levothyroxine (SYNTHROID) 88 mcg tablet Take 88 mcg by mouth Daily (before breakfast). Yes Provider, Historical   trimethoprim-sulfamethoxazole (BACTRIM DS) 160-800 mg per tablet Take 2 Tabs by mouth two (2) times a day for 10 days. 5/16/19 5/26/19 Yes Kory Leal MD   amoxicillin-clavulanate (AUGMENTIN) 875-125 mg per tablet Take 1 Tab by mouth two (2) times a day for 10 days. 5/16/19 5/26/19 Yes Kory Leal MD   magic mouthwash Ettie Gross) susp Take 5 mL by mouth every four (4) hours as needed.  Maalox, benadryl, lidocaine - NO NYSTATIN 1/28/19   Kathy Paris MD   vit a-vit c-zinc-propolis (ZINC, WITH A AND C, LOZENGES) lozg Use lozenges as needed 1/28/19   Kathy Paris MD       No Known Allergies    Review of Systems  A comprehensive review of systems was negative except for that written in the History of Present Illness. Physical Exam:      Visit Vitals  /68   Pulse 79   Temp 98.5 °F (36.9 °C)   Resp 17   Ht 5' 6\" (1.676 m)   Wt 102.1 kg (225 lb)   SpO2 100%   Breastfeeding? No   BMI 36.32 kg/m²       Physical Exam:  Pertinent items are noted in HPI. Labs Reviewed: All lab results for the last 24 hours reviewed. Assessment/Plan     Principal Problem:    Wound infection (5/17/2019)    Active Problems:    SIRS (systemic inflammatory response syndrome) ([facility] [address] (5/17/2019)      MRSA infection (5/17/2019)        Defer to TANYA Purdy team for Vac care and change and TANYA Galan 23 physicians for chronic care. Not sure if mesh present.

## 2019-05-18 NOTE — ROUTINE PROCESS
Bedside and Verbal shift change report given to MAO Fine RN (oncoming nurse) by Milagros Urbina RN (offgoing nurse). Report included the following information SBAR, Kardex, Intake/Output, MAR and Recent Results.

## 2019-05-18 NOTE — ROUTINE PROCESS
TRANSFER - OUT REPORT: 
 
Verbal report given to MAO Yan RN(name) on [place]  being transferred to Medical(unit) for routine progression of care Report consisted of patients Situation, Background, Assessment and  
Recommendations(SBAR). Information from the following report(s) SBAR, ED Summary, Procedure Summary, Intake/Output, MAR, Recent Results and Cardiac Rhythm sinus tach to normal sinus  was reviewed with the receiving nurse. Lines:  
Peripheral IV 05/17/19 Right Antecubital (Active) Peripheral IV 05/17/19 Left Antecubital (Active) Site Assessment Clean, dry, & intact 5/17/2019  7:30 PM  
Phlebitis Assessment 0 5/17/2019  7:30 PM  
Infiltration Assessment 0 5/17/2019  7:30 PM  
Dressing Status Clean, dry, & intact 5/17/2019  7:30 PM  
Hub Color/Line Status Pink;Flushed;Patent 5/17/2019  7:30 PM  
Action Taken Blood drawn 5/17/2019  7:30 PM  
  
 
Opportunity for questions and clarification was provided. Patient transported with: 
 [employer]

## 2019-05-18 NOTE — WOUND CARE
Pt seen in Wound care clinic on Thursday. Vac dressing assessed and seal intact. Vac via due to be changed on Monday. If vac is removed or seal is lost and no  
 suction to dressing for more than 2 hrs, please remove vac and place saline moistened gauze dressing to wound. Beata Santillan

## 2019-05-18 NOTE — ED NOTES
Pt ambulated to bathroom with assistance for urine specimen, pt reports feeling light headed upon arrival back to room. Pt informed to use call button before getting out of bed.

## 2019-05-18 NOTE — PROGRESS NOTES
Chart reviewed as CM on call. Pt admitted for wound infection. Pt noted to have wound vac. General surgery consult and wound care consult noted. Provider, please advise if pt will require IV abx at discharge. Pt noted to be listed as self pay. No PCP listed. CM will follow for transition of care needs. Reason for Admission:   Per H&P, pt is \"26 y/o female s/p recent umbilical hernia repair with abdominoplasty who has wound dehiscence and MRSA superinfection with SIRS and possible bacteremia. Will consult general surgery for evaluation. Will also place consult for wound care to help manage her wound vac. Can consider CT scan to ensure no deeper infection is present. She will be admitted for IV vancomycin as she has failed outpatient antibiotics to manage her infection. I will attempt to express R labial furuncle and can consider I/D if this does not help to drain the infection.   
  
Additionally, given how many abscesses this patient has had and the severity, I would recommend ID consultation (either inpatient or outpatient) for a MRSA decolonization procedure for both the patient and her . \" 
 
RRAT Score:      9 low Plan for utilizing home health:   TBD Current Advanced Directive/Advance Care Plan: Not on file Likelihood of Readmission:  low Transition of Care Plan:     TBD Care Management Interventions Transition of Care Consult (CM Consult): Discharge Planning

## 2019-05-18 NOTE — PROGRESS NOTES
0600-Witnessed T. Ebb MARIBELL Anderson waste 360mg/18ml of Lidocaine.  
Maria Del Carmen Estrada RN

## 2019-05-19 LAB
ALBUMIN SERPL-MCNC: 2.8 G/DL (ref 3.4–5)
ALBUMIN/GLOB SERPL: 0.9 {RATIO} (ref 0.8–1.7)
ALP SERPL-CCNC: 73 U/L (ref 45–117)
ALT SERPL-CCNC: 64 U/L (ref 13–56)
ANION GAP SERPL CALC-SCNC: 7 MMOL/L (ref 3–18)
AST SERPL-CCNC: 35 U/L (ref 15–37)
BILIRUB SERPL-MCNC: 0.2 MG/DL (ref 0.2–1)
BUN SERPL-MCNC: 5 MG/DL (ref 7–18)
BUN/CREAT SERPL: 9 (ref 12–20)
CALCIUM SERPL-MCNC: 8.4 MG/DL (ref 8.5–10.1)
CHLORIDE SERPL-SCNC: 109 MMOL/L (ref 100–108)
CO2 SERPL-SCNC: 24 MMOL/L (ref 21–32)
CREAT SERPL-MCNC: 0.55 MG/DL (ref 0.6–1.3)
DATE LAST DOSE: ABNORMAL
ERYTHROCYTE [DISTWIDTH] IN BLOOD BY AUTOMATED COUNT: 12.5 % (ref 11.6–14.5)
GLOBULIN SER CALC-MCNC: 3 G/DL (ref 2–4)
GLUCOSE SERPL-MCNC: 77 MG/DL (ref 74–99)
HCT VFR BLD AUTO: 34.4 % (ref 35–45)
HGB BLD-MCNC: 10.9 G/DL (ref 12–16)
MCH RBC QN AUTO: 26.7 PG (ref 24–34)
MCHC RBC AUTO-ENTMCNC: 31.7 G/DL (ref 31–37)
MCV RBC AUTO: 84.1 FL (ref 74–97)
PLATELET # BLD AUTO: 367 K/UL (ref 135–420)
PMV BLD AUTO: 10 FL (ref 9.2–11.8)
POTASSIUM SERPL-SCNC: 3.7 MMOL/L (ref 3.5–5.5)
PROT SERPL-MCNC: 5.8 G/DL (ref 6.4–8.2)
RBC # BLD AUTO: 4.09 M/UL (ref 4.2–5.3)
REPORTED DOSE,DOSE: ABNORMAL UNITS
REPORTED DOSE/TIME,TMG: 834
SODIUM SERPL-SCNC: 140 MMOL/L (ref 136–145)
VANCOMYCIN TROUGH SERPL-MCNC: 7.4 UG/ML (ref 10–20)
WBC # BLD AUTO: 7.1 K/UL (ref 4.6–13.2)

## 2019-05-19 PROCEDURE — 74011250637 HC RX REV CODE- 250/637: Performed by: FAMILY MEDICINE

## 2019-05-19 PROCEDURE — 74011000250 HC RX REV CODE- 250: Performed by: FAMILY MEDICINE

## 2019-05-19 PROCEDURE — 85027 COMPLETE CBC AUTOMATED: CPT

## 2019-05-19 PROCEDURE — 80202 ASSAY OF VANCOMYCIN: CPT

## 2019-05-19 PROCEDURE — 74011250636 HC RX REV CODE- 250/636: Performed by: FAMILY MEDICINE

## 2019-05-19 PROCEDURE — 65270000029 HC RM PRIVATE

## 2019-05-19 PROCEDURE — 36415 COLL VENOUS BLD VENIPUNCTURE: CPT

## 2019-05-19 PROCEDURE — 77030018836 HC SOL IRR NACL ICUM -A

## 2019-05-19 PROCEDURE — 80053 COMPREHEN METABOLIC PANEL: CPT

## 2019-05-19 RX ORDER — VANCOMYCIN HYDROCHLORIDE
1250 EVERY 8 HOURS
Status: DISCONTINUED | OUTPATIENT
Start: 2019-05-20 | End: 2019-05-23 | Stop reason: HOSPADM

## 2019-05-19 RX ADMIN — SODIUM CHLORIDE 125 ML/HR: 900 INJECTION, SOLUTION INTRAVENOUS at 07:26

## 2019-05-19 RX ADMIN — VANCOMYCIN HYDROCHLORIDE 1500 MG: 10 INJECTION, POWDER, LYOPHILIZED, FOR SOLUTION INTRAVENOUS at 20:35

## 2019-05-19 RX ADMIN — LEVOTHYROXINE SODIUM 87.5 MCG: 25 TABLET ORAL at 06:17

## 2019-05-19 RX ADMIN — TRAMADOL HYDROCHLORIDE 50 MG: 50 TABLET, FILM COATED ORAL at 20:36

## 2019-05-19 RX ADMIN — VANCOMYCIN HYDROCHLORIDE 1500 MG: 10 INJECTION, POWDER, LYOPHILIZED, FOR SOLUTION INTRAVENOUS at 08:34

## 2019-05-19 RX ADMIN — SODIUM CHLORIDE 125 ML/HR: 900 INJECTION, SOLUTION INTRAVENOUS at 17:24

## 2019-05-19 RX ADMIN — TRAMADOL HYDROCHLORIDE 50 MG: 50 TABLET, FILM COATED ORAL at 09:28

## 2019-05-19 NOTE — PROGRESS NOTES
Hospitalist Progress Note Patient: Chavo Goins MRN: [id number]  CSN: [id number] YOB: 1990  Age: 33 y.o. Sex: female DOA: 5/17/2019 LOS:  LOS: 2 days Chief Complaint:  Wound infection Assessment/Plan Disposition : 
Patient Active Problem List  
Diagnosis Code  Status post abdominoplasty Z98.890  
 Dehiscence of closure of subcutaneous tissue, initial encounter T81.31XA  Dehiscence of external surgical wound T81. 31XA  Wound infection T14. 8XXA, L08.9  SIRS (systemic inflammatory response syndrome) (HCC) R65.10  MRSA infection A49.02  
 
 
-Continue parenteral antibiotics. MRSA identified. Contact precautions.  
 
-Suggest decolonization protocol following hospitalization. 
 
-Wound care consulted.   
 
-Had paniculectomy in [facility] thus unable to f/u with performing surgeon. Should be seen by plastics locally before too long. Subjective: 
 
Feeling better. Headaches resolved. Review of systems: 
 
Constitutional: denies fevers, chills, myalgias Respiratory: denies SOB, cough Cardiovascular: denies chest pain, palpitations Gastrointestinal: denies nausea, vomiting, diarrhea Vital signs/Intake and Output: 
Visit Vitals /76 Pulse 72 Temp 98.6 °F (37 °C) Resp 16 Ht 5' 6\" (1.676 m) Wt 102.1 kg (225 lb) SpO2 99% Breastfeeding? No  
BMI 36.32 kg/m² Current Shift:  05/19 0701 - 05/19 1900 In: 6188 [I.V.:1405] Out: 800 [Urine:800] Last three shifts:  05/17 1901 - 05/19 0700 In: 2704.6 [P.O.:600; I.V.:2104.6] Out: 4000 [XNGQE:6465] Exam: 
 
General: Well developed, alert, NAD, OX3 Head/Neck: mmm CVS:s1s2 Lungs:Ctab/l Abdomen: Soft, bs, surgical scar with surrounding erythema. Extremities: No edema. Labs: Results:  
   
Chemistry Recent Labs 05/19/19 
0345 05/18/19 
0348 05/17/19 
1920 GLU 77 91 118*  139 134* K 3.7 3.5 3.7 * 107 99* CO2 24 25 26 BUN 5* 8 8 CREA 0.55* 0.83 1.02  
CA 8.4* 8.6 9.3 AGAP 7 7 9 BUCR 9* 10* 8* AP 73 65 85  
TP 5.8* 6.2* 7.8 ALB 2.8* 3.0* 3.7 GLOB 3.0 3.2 4.1* AGRAT 0.9 0.9 0.9  
  
CBC w/Diff Recent Labs 05/19/19 
0345 05/18/19 
0348 05/17/19 
1920 WBC 7.1 9.9 14.2*  
RBC 4.09* 4.18* 4.92  
HGB 10.9* 11.2* 13.1 HCT 34.4* 34.9* 40.7  384 481* GRANS  --   --  86* LYMPH  --   --  7* EOS  --   --  2 Cardiac Enzymes No results for input(s): CPK, CKND1, HAYDER in the last 72 hours. No lab exists for component: Bulah Ratel Coagulation No results for input(s): PTP, INR, APTT in the last 72 hours. No lab exists for component: INREXT Lipid Panel No results found for: CHOL, CHOLPOCT, CHOLX, CHLST, CHOLV, 226951, HDL, LDL, LDLC, DLDLP, 697722, VLDLC, VLDL, TGLX, TRIGL, TRIGP, TGLPOCT, CHHD, CHHDX  
BNP No results for input(s): BNPP in the last 72 hours. Liver Enzymes Recent Labs 05/19/19 
0345  
TP 5.8* ALB 2.8* AP 73 SGOT 35 Thyroid Studies No results found for: T4, T3U, TSH, TSHEXT Procedures/imaging: see electronic medical records for all procedures/Xrays and details which were not copied into this note but were reviewed prior to creation of Plan Lynn Swan MD

## 2019-05-19 NOTE — PROGRESS NOTES
DC Plan: MD follow up, family assistance Chart reviewed. Pt admitted for wound infection. Met with pt at bedside, no friend/family present. Pt states she is independent. Pt states she lives with her . Pt noted to be self pay, but states she has insurance. Med Assist called and msg left. Pt states she goes to [facility] for PCP. Pts only concern pertains to her bleeding. Pt wants to know if any imaging will be done. Will defer to providers. Provider, please advise if pt will need home health. Pt states she has been going to wound care clinic. No other concerns identified. CM will cont to follow. Care Management Interventions PCP Verified by CM: Yes([facility]) Mode of Transport at Discharge: Other (see comment)() Transition of Care Consult (CM Consult): Discharge Planning Current Support Network: Lives with Spouse Confirm Follow Up Transport: Family Plan discussed with Pt/Family/Caregiver:  Yes

## 2019-05-19 NOTE — PROGRESS NOTES
0725-received report from Genesis [place] 10 included SBAR MAR and Kardex. Shift summary-no change in pt status, dressing changed wet to dry. Pt using Purewick to void. Bedside and Verbal shift change report given to Salina Jimenez RN (oncoming nurse) by Kimmy Sanchez RN (offgoing nurse). Report included the following information SBAR, Kardex and MAR.

## 2019-05-19 NOTE — PROGRESS NOTES
Rec plastic surgery consult and F/U if needed since this was primarily a panniculectomy not a hernia repair from what I can tell. I do not f/u panniculectomy surgical patients. The wound is a plastics issue.

## 2019-05-19 NOTE — PROGRESS NOTES
2105 patient reported generalized throbbing pain rated 7/10. Gave Tylenol PO.  
 
2205 patient stated no relief from the given pain medication. 2210 talked to Dr. Ambreen Hoyt about patients pain that does not relieved by Tylenol PO. Order was given with telephone readback for Ultram 50mg PO every 6hrs and PRN. Gave Ultram with relief. 1231 patient's bedpad soaked with bright red blood. Changed wound dressing. 12 [facility] talked to Dr. Ambreen Hoyt about patient's Heparin that was due at 21  and the bleeding incident. Dr. Claudine Patterson to hold and discontinue heparin, mentioned that she will see and check the patient. 0100 MD at bedside. 3240 patient refused lidocaine patch. 
 
8458 Patient agreed to have the lidocaine patch applied on her lower back Call bell within reach. Patient had been using bedpan due to wound discomfort and bleeding.

## 2019-05-19 NOTE — PROGRESS NOTES
RN called me about increased bloody drainage from wound. Will hold subQ heparin (pt has TEDs/SCDs and is otherwise healthy). Will just check labs in the morning. Her wound vac was taken off today and replaced with abd pad. Wound care did not see pt to dress wound today. Will request wound vac be replaced tomorrow as it's hard for her to get out of bed without dripping blood all over. RN placed another consult to wound care. Discussed plan of care with patient--need to get neg blood cultures and wound addressed prior to discharge.  
 
Halima Briones MD 
Nocturnist

## 2019-05-20 ENCOUNTER — APPOINTMENT (OUTPATIENT)
Dept: CT IMAGING | Age: 29
DRG: 862 | End: 2019-05-20
Attending: PHYSICIAN ASSISTANT
Payer: COMMERCIAL

## 2019-05-20 ENCOUNTER — APPOINTMENT (OUTPATIENT)
Dept: WOUND CARE | Age: 29
End: 2019-05-20
Attending: FAMILY MEDICINE
Payer: COMMERCIAL

## 2019-05-20 LAB
ALBUMIN SERPL-MCNC: 3 G/DL (ref 3.4–5)
ALBUMIN/GLOB SERPL: 0.9 {RATIO} (ref 0.8–1.7)
ALP SERPL-CCNC: 79 U/L (ref 45–117)
ALT SERPL-CCNC: 61 U/L (ref 13–56)
ANION GAP SERPL CALC-SCNC: 7 MMOL/L (ref 3–18)
AST SERPL-CCNC: 24 U/L (ref 15–37)
BACTERIA SPEC CULT: ABNORMAL
BILIRUB SERPL-MCNC: 0.2 MG/DL (ref 0.2–1)
BUN SERPL-MCNC: 6 MG/DL (ref 7–18)
BUN/CREAT SERPL: 10 (ref 12–20)
CALCIUM SERPL-MCNC: 8.8 MG/DL (ref 8.5–10.1)
CHLORIDE SERPL-SCNC: 107 MMOL/L (ref 100–108)
CO2 SERPL-SCNC: 27 MMOL/L (ref 21–32)
CREAT SERPL-MCNC: 0.59 MG/DL (ref 0.6–1.3)
ERYTHROCYTE [DISTWIDTH] IN BLOOD BY AUTOMATED COUNT: 12.5 % (ref 11.6–14.5)
GLOBULIN SER CALC-MCNC: 3.4 G/DL (ref 2–4)
GLUCOSE SERPL-MCNC: 90 MG/DL (ref 74–99)
GRAM STN SPEC: ABNORMAL
GRAM STN SPEC: ABNORMAL
HBA1C MFR BLD: 4.9 % (ref 4.2–5.6)
HCT VFR BLD AUTO: 35 % (ref 35–45)
HGB BLD-MCNC: 11 G/DL (ref 12–16)
MCH RBC QN AUTO: 26.4 PG (ref 24–34)
MCHC RBC AUTO-ENTMCNC: 31.4 G/DL (ref 31–37)
MCV RBC AUTO: 84.1 FL (ref 74–97)
PLATELET # BLD AUTO: 435 K/UL (ref 135–420)
PMV BLD AUTO: 9.2 FL (ref 9.2–11.8)
POTASSIUM SERPL-SCNC: 3.8 MMOL/L (ref 3.5–5.5)
PROT SERPL-MCNC: 6.4 G/DL (ref 6.4–8.2)
RBC # BLD AUTO: 4.16 M/UL (ref 4.2–5.3)
SERVICE CMNT-IMP: ABNORMAL
SODIUM SERPL-SCNC: 141 MMOL/L (ref 136–145)
WBC # BLD AUTO: 8.8 K/UL (ref 4.6–13.2)

## 2019-05-20 PROCEDURE — 65270000029 HC RM PRIVATE

## 2019-05-20 PROCEDURE — 74011636320 HC RX REV CODE- 636/320: Performed by: FAMILY MEDICINE

## 2019-05-20 PROCEDURE — 85027 COMPLETE CBC AUTOMATED: CPT

## 2019-05-20 PROCEDURE — 36415 COLL VENOUS BLD VENIPUNCTURE: CPT

## 2019-05-20 PROCEDURE — 74011250636 HC RX REV CODE- 250/636: Performed by: FAMILY MEDICINE

## 2019-05-20 PROCEDURE — 74011636320 HC RX REV CODE- 636/320: Performed by: PHYSICIAN ASSISTANT

## 2019-05-20 PROCEDURE — 74177 CT ABD & PELVIS W/CONTRAST: CPT

## 2019-05-20 PROCEDURE — 77030033269 HC SLV COMPR SCD KNE2 CARD -B

## 2019-05-20 PROCEDURE — 80053 COMPREHEN METABOLIC PANEL: CPT

## 2019-05-20 PROCEDURE — 74011250637 HC RX REV CODE- 250/637: Performed by: FAMILY MEDICINE

## 2019-05-20 PROCEDURE — 74011000250 HC RX REV CODE- 250: Performed by: FAMILY MEDICINE

## 2019-05-20 PROCEDURE — 77030012890

## 2019-05-20 PROCEDURE — 83036 HEMOGLOBIN GLYCOSYLATED A1C: CPT

## 2019-05-20 RX ADMIN — SODIUM CHLORIDE 125 ML/HR: 900 INJECTION, SOLUTION INTRAVENOUS at 17:01

## 2019-05-20 RX ADMIN — SODIUM CHLORIDE 125 ML/HR: 900 INJECTION, SOLUTION INTRAVENOUS at 04:35

## 2019-05-20 RX ADMIN — VANCOMYCIN HYDROCHLORIDE 1250 MG: 10 INJECTION, POWDER, LYOPHILIZED, FOR SOLUTION INTRAVENOUS at 05:45

## 2019-05-20 RX ADMIN — LEVOTHYROXINE SODIUM 87.5 MCG: 25 TABLET ORAL at 05:45

## 2019-05-20 RX ADMIN — TRAMADOL HYDROCHLORIDE 50 MG: 50 TABLET, FILM COATED ORAL at 17:10

## 2019-05-20 RX ADMIN — DIATRIZOATE MEGLUMINE AND DIATRIZOATE SODIUM 30 ML: 660; 100 LIQUID ORAL; RECTAL at 13:06

## 2019-05-20 RX ADMIN — VANCOMYCIN HYDROCHLORIDE 1250 MG: 10 INJECTION, POWDER, LYOPHILIZED, FOR SOLUTION INTRAVENOUS at 21:22

## 2019-05-20 RX ADMIN — IOPAMIDOL 100 ML: 612 INJECTION, SOLUTION INTRAVENOUS at 16:47

## 2019-05-20 RX ADMIN — TRAMADOL HYDROCHLORIDE 50 MG: 50 TABLET, FILM COATED ORAL at 22:37

## 2019-05-20 RX ADMIN — VANCOMYCIN HYDROCHLORIDE 1250 MG: 10 INJECTION, POWDER, LYOPHILIZED, FOR SOLUTION INTRAVENOUS at 12:36

## 2019-05-20 RX ADMIN — TRAMADOL HYDROCHLORIDE 50 MG: 50 TABLET, FILM COATED ORAL at 09:14

## 2019-05-20 RX ADMIN — TRAMADOL HYDROCHLORIDE 50 MG: 50 TABLET, FILM COATED ORAL at 03:15

## 2019-05-20 NOTE — PROGRESS NOTES
Afebrile; pain controlled below goal via prescribed regimen. ID added to care team today and pt also to receive abd CTA.

## 2019-05-20 NOTE — PROGRESS NOTES
Rounds with TUTU Mejia -- Consult ID, abd CT this afternoon to further evaluate infection. Summary -- Pt comfortable throughout shift. Pain fully responsive to Ultram prn. No s/sx active bleeding in wound bed. Received eval from [facility] team, ID, and abd/pelvis CT this afternoon. Supportive and engaged family at bedside intermittently. Patient Vitals for the past 12 hrs: 
 Temp Pulse Resp BP SpO2  
05/20/19 1908 98.5 °F (36.9 °C) 86 16 114/80 96 % 05/20/19 1516 98.3 °F (36.8 °C) 69 16 119/83 100 % 05/20/19 1052 98.4 °F (36.9 °C) 81 16 128/82 98 % 05/20/19 0818 98.3 °F (36.8 °C) 71 16 125/76 99 % Bedside shift change report given to Ciarra Luong RN (oncoming nurse) by Silva Valentin RN (offgoing nurse). Report included the following information SBAR, Kardex, ED Summary, Procedure Summary, Intake/Output, MAR and Recent Results.

## 2019-05-20 NOTE — PROGRESS NOTES
Hospitalist Progress Note Patient: Karina Marrufo MRN: [id number]  CSN: [id number] YOB: 1990  Age: 33 y.o. Sex: female DOA: 5/17/2019 LOS:  LOS: 3 days Chief Complaint: 
 
Wound infection Assessment/Plan 1. Surgical site infection status post abdominoplasty 2. Dehiscence of surgical incision 3. MRSA infection 4. Labial abscess 1. Continue intravenous vancomycin as MRSA has been recovered from surgical site. Appreciate wound care team assistance with wound vac. Will get CT abdomen and pelvis with contrast to rule out any deep abscesses. 2. Management as above. She is being followed by wound care for wound vac. 3. Consult placed to infectious diseases, Dr Margarita Martines, for recommendations of management of patient's MRSA infection. Blood cultures remain negative x 3 days after admission. Patient has questions regarding decolonization of MRSA. 4. Patient had abscess previously expressed by Dr Venkat Kingsley. MRSA obtained from wound. Await further recommendations from ID. DVT prophylaxis with sequential compression devices. Disposition: Awaiting CT scan and ID consultation. Continue hospitalization for intravenous antibiotics. Patient Active Problem List  
Diagnosis Code  Status post abdominoplasty Z98.890  
 Dehiscence of closure of subcutaneous tissue, initial encounter T81.31XA  Dehiscence of external surgical wound T81. 31XA  Wound infection T14. 8XXA, L08.9  SIRS (systemic inflammatory response syndrome) (HCC) R65.10  MRSA infection A49.02 Subjective: 
 
Feeling well, still some mild pain but it is controlled. Has questions regarding decolonization. Review of systems: 
 
Constitutional: denies fevers, chills, myalgias Respiratory: denies SOB, cough Cardiovascular: denies chest pain, palpitations Gastrointestinal: denies nausea, vomiting, diarrhea Vital signs/Intake and Output: 
Visit Vitals /76 (BP 1 Location: Left arm, BP Patient Position: At rest) Pulse 71 Temp 98.3 °F (36.8 °C) Resp 16 Ht 5' 6\" (1.676 m) Wt 102.1 kg (225 lb) SpO2 99% Breastfeeding? No  
BMI 36.32 kg/m² Current Shift:  05/20 0701 - 05/20 1900 In: 0254 [P.O.:120; I.V.:1456] Out: 0 Last three shifts:  05/18 1901 - 05/20 0700 In: 2125 [P.O.:720; I.V.:1405] Out: 5275 [PJNWS:0787] Exam: 
 
General: Obese female, alert, NAD, OX3 Head/Neck: NCAT, supple, No masses, No lymphadenopathy CVS:Regular rate and rhythm, no M/R/G, S1/S2 heard, no thrill Lungs:Clear to auscultation bilaterally, no wheezes, rhonchi, or rales Abdomen: Soft, +tender, No distention, Normal Bowel sounds, No hepatomegaly. Lower abdominal incision scars with dressing in place. Extremities: No C/C/E, pulses palpable 2+ Skin:normal texture and turgor Neuro:grossly normal , follows commands Psych:appropriate : Deferred. Labs: Results:  
   
Chemistry Recent Labs  
  05/20/19 0322 05/19/19 0345 05/18/19 
4571 GLU 90 77 91  140 139  
K 3.8 3.7 3.5  109* 107 CO2 27 24 25 BUN 6* 5* 8  
CREA 0.59* 0.55* 0.83 CA 8.8 8.4* 8.6 AGAP 7 7 7 BUCR 10* 9* 10* AP 79 73 65  
TP 6.4 5.8* 6.2* ALB 3.0* 2.8* 3.0*  
GLOB 3.4 3.0 3.2 AGRAT 0.9 0.9 0.9  
  
CBC w/Diff Recent Labs  
  05/20/19 0322 05/19/19 0345 05/18/19 0348 05/17/19 
1920 WBC 8.8 7.1 9.9 14.2*  
RBC 4.16* 4.09* 4.18* 4.92  
HGB 11.0* 10.9* 11.2* 13.1 HCT 35.0 34.4* 34.9* 40.7 * 367 384 481* GRANS  --   --   --  86* LYMPH  --   --   --  7* EOS  --   --   --  2 Cardiac Enzymes No results for input(s): CPK, CKND1, HAYDER in the last 72 hours. No lab exists for component: Makenzie Curlin Coagulation No results for input(s): PTP, INR, APTT in the last 72 hours. No lab exists for component: INREXT Lipid Panel No results found for: CHOL, CHOLPOCT, CHOLX, [facility], CHOLV, 980836, HDL, LDL, LDLC, DLDLP, 642699, VLDLC, VLDL, TGLX, TRIGL, TRIGP, TGLPOCT, CHHD, CHHDX  
BNP No results for input(s): BNPP in the last 72 hours. Liver Enzymes Recent Labs  
  05/20/19 
0322 TP 6.4 ALB 3.0* AP 79 SGOT 24 Thyroid Studies No results found for: T4, T3U, TSH, TSHEXT Procedures/imaging: see electronic medical records for all procedures/Xrays and details which were not copied into this note but were reviewed prior to creation of Plan Betsy Worthington PA-C

## 2019-05-20 NOTE — ROUTINE PROCESS
Bedside and Verbal shift change report given to Kyleigh Menchaca RN (oncoming nurse) by Dillon Monroe RN 
 (offgoing nurse). Report included the following information SBAR, Kardex, ED Summary, [facility] and Recent Results.

## 2019-05-20 NOTE — ROUTINE PROCESS
Bedside shift change report given to Dennice Gosselin, RN (oncoming nurse) by Roger Coyle RN (offgoing nurse). Report included the following information SBAR, Kardex, ED Summary, Intake/Output, MAR and Recent Results.

## 2019-05-20 NOTE — PROGRESS NOTES
Shift summary: uneventful shift. Patient required pain medication with relief. Dressing changed from wet to dry with Normal Saline, ABD pad and 4x4. Call bell within reach.

## 2019-05-20 NOTE — CONSULTS
Infectious Disease Consultation Note    Date of Admission: 5/17/2019    Date of Consultation: 5/20/2019    Referred by: Dr. Joon Weaver       Reason for Referral: Surgical site infection s/p abdominoplasty       Current Antimicrobials: Prior Antimicrobials   Vancomycin 5/17 - 3      Assessment / Plan:     Post-operative wound infection  - ulcerating rather than suppurative. 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 Suspect rapidly growing NTM ie, M abscessus, M fortuitum  - does not appear to be a primary MRSA infection. 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 Sepsis  - mild. Leukocytosis, low grade fever present on admission  - resolved    Hypothyroidism    H/o hernia repair      Microbiology:   5/13 wdcx MRSA, CONS, E faecalis  5/17 wdcx MRSA, CONS   blcx NGTD x 2    Lines / Catheters:   piv    HPI:     33year-old obese female admitted to [facility] 5/17 with post-operative wound infection growing MRSA on culture. She underwent abdominoplasty in the [facility] on 4/1/2157 and began noticing redness on the wound about 3-4 weeks later. Some areas of the central portion of the wound began to break down without draining pus and these ulcerated areas progressively increased in size. She observed some sutures in the wound base and used \"sterile\" scissors to remove them and applied honey. [facility] and [facility] Bactrim and Keflex. Wound culture from 5/13 grew MRSA, CONS and E faecalis.  She did not improve on the antibiotics and came to the ED 5/17 and was admitted for IV antibiotic therapy. Vancomycin was started and wound culture from admission has grown MRSA and CONS. She has a history of recurrent boils in her axillae and legs as well as vaginal \"abscesses\" but these have not been cultured in the past.  The vaginal abscesses occur around four times a year but last on was one week PTA. Her  also has recurrent skin infections. Active Hospital Problems    Diagnosis Date Noted    Wound infection 05/17/2019    SIRS (systemic inflammatory response syndrome) ([facility] [place] 05/17/2019    MRSA infection 05/17/2019     Past Medical History:   Diagnosis Date    Hypothyroidism      Past Surgical History:   Procedure Laterality Date    HX HERNIA REPAIR       History reviewed. No pertinent family history.   Social History     Socioeconomic History    Marital status:      Spouse name: Not on file    Number of children: Not on file    Years of education: Not on file    Highest education level: Not on file   Occupational History    Not on file   Social Needs    Financial resource strain: Not on file    Food insecurity:     Worry: Not on file     Inability: Not on file    Transportation needs:     Medical: Not on file     Non-medical: Not on file   Tobacco Use    Smoking status: Never Smoker    Smokeless tobacco: Never Used   Substance and Sexual Activity    Alcohol use: No     Frequency: Never    Drug use: Not Currently    Sexual activity: Not on file   Lifestyle    Physical activity:     Days per week: Not on file     Minutes per session: Not on file    Stress: Not on file   Relationships    Social connections:     Talks on phone: Not on file     Gets together: Not on file     Attends Voodoo service: Not on file     Active member of club or organization: Not on file     Attends meetings of clubs or organizations: Not on file     Relationship status: Not on file    Intimate partner violence:     Fear of current or ex partner: Not on file     Emotionally abused: Not on file     Physically abused: Not on file     Forced sexual activity: Not on file   Other Topics Concern    Not on file   Social History Narrative    Not on file       Allergies:    Patient has no known allergies. .    Medications:     Current Facility-Administered Medications   Medication Dose Route Frequency    vancomycin (VANCOCIN) 1250 mg in  ml infusion  1,250 mg IntraVENous Q8H    acetaminophen (TYLENOL) tablet 650 mg  650 mg Oral Q6H PRN    lidocaine 4 % patch 1 Patch  1 Patch TransDERmal Q24H    butalbital-acetaminophen-caffeine (FIORICET, ESGIC) -40 mg per tablet 2 Tab  2 Tab Oral Q6H PRN    traMADol (ULTRAM) tablet 50 mg  50 mg Oral Q6H PRN    levothyroxine (SYNTHROID) tablet 87.5 mcg  87.5 mcg Oral 6am    0.9% sodium chloride infusion  125 mL/hr IntraVENous CONTINUOUS    Vancomycin- pharmacy to dose  1 Each Other Rx Dosing/Monitoring       ROS:   A comprehensive review of systems was negative except for that written in the History of Present Illness. Physical Exam:     Temp (24hrs), Av.3 °F (36.8 °C), Min:97.9 °F (36.6 °C), Max:98.8 °F (37.1 °C)    Visit Vitals  /82 (BP 1 Location: Left arm, BP Patient Position: At rest)   Pulse 81   Temp 98.4 °F (36.9 °C)   Resp 16   Ht 5' 6\" (1.676 m)   Wt 102.1 kg (225 lb)   LMP 2019   SpO2 98%   Breastfeeding? No   BMI 36.32 kg/m²       General: Well developed, obese 33 y.o.  female in no acute distress.   ENT: ENT exam normal, no neck nodes or sinus tenderness  Head: normocephalic, without obvious abnormality  Mouth:  mucous membranes moist, pharynx normal without lesions  Neck: supple, symmetrical, trachea midline   Cardio:  regular rate and rhythm, S1, S2 normal, no murmur, click, rub or gallop  Chest: inspection normal - no chest wall deformities or tenderness  Lungs: clear to auscultation and no wheezes or rales  Abdomen: obese, soft, large areas of ulceration periumbilical, midline to hypogastric area (see photo)  Extremities:  no redness or tenderness in the calves or thighs, no edema  Neuro: Grossly normal    Wound care RN photo 5/20           Lab results:     Chemistry  Recent Labs     05/20/19 0322 05/19/19 0345 05/18/19 0348   GLU 90 77 91    140 139   K 3.8 3.7 3.5    109* 107   CO2 27 24 25   BUN 6* 5* 8   CREA 0.59* 0.55* 0.83   CA 8.8 8.4* 8.6   AGAP 7 7 7   BUCR 10* 9* 10*   AP 79 73 65   TP 6.4 5.8* 6.2*   ALB 3.0* 2.8* 3.0*   GLOB 3.4 3.0 3.2   AGRAT 0.9 0.9 0.9       CBC w/ Diff  Recent Labs     05/20/19 0322 05/19/19 0345 05/18/19 0348 05/17/19 1920   WBC 8.8 7.1 9.9 14.2*   RBC 4.16* 4.09* 4.18* 4.92   HGB 11.0* 10.9* 11.2* 13.1   HCT 35.0 34.4* 34.9* 40.7   * 367 384 481*   GRANS  --   --   --  86*   LYMPH  --   --   --  7*   EOS  --   --   --  2       Microbiology  All Micro Results     Procedure Component Value Units Date/Time    CULTURE, WOUND Adilson Congo STAIN [[id number]]  (Abnormal)  (Susceptibility) Collected:  05/18/19 0540    Order Status:  Completed Specimen:  Wound from [facility] Updated:  05/20/19 0934     Special Requests: NO SPECIAL REQUESTS        GRAM STAIN RARE WBC'S         NO ORGANISMS SEEN        Culture result:       FEW **METHICILLIN RESISTANT STAPHYLOCOCCUS AUREUS**                  FEW STAPHYLOCOCCUS SPECIES, COAGULASE NEGATIVE            CALLED TO Vickie Orourke RN 3S BY [facility] AT [phone] ON [id number]    CULTURE, BLOOD [[id number]] Collected:  05/17/19 1920    Order Status:  Completed Specimen:  Blood Updated:  05/20/19 4448     Special Requests: NO SPECIAL REQUESTS        Culture result: NO GROWTH 3 DAYS       CULTURE, BLOOD 2nd DRAW (required for [facility]) [[id number]] Collected:  05/17/19 1950    Order Status:  Completed Specimen:  Blood Updated:  05/20/19 0748     Special Requests: NO SPECIAL REQUESTS        Culture result: NO GROWTH 3 DAYS       CULTURE, ANAEROBIC [[id number]] Collected:  05/18/19 0540    Order Status: Completed Specimen:  Wound Drainage Updated:  05/19/19 1036     Special Requests: NO SPECIAL REQUESTS        Culture result:       CULTURE IN PROGRESS,FURTHER UPDATES TO FOLLOW          CULTURE, Lena Roldan STAIN [[id number]] Collected:  05/18/19 0530    Order Status:  Canceled Specimen:  Wound from [address] [place] [address], Lena Roldan STAIN [[id number]]     Order Status:  Canceled Specimen:  Wound from Nelida Christie MD, [facility]  Infectious Disease Specialist  Pager [phone]

## 2019-05-20 NOTE — PROGRESS NOTES
Vancomycin trough = 7.4 @ 1925 on 05/19/2019   New vancomycin dosing regimen :  
Estimated Pharmacokinetic Parameters (based on population kinetics) Vd: 71 L (0.7 L/kg) Hiram: 0.104 hr-1 (T1/2 = 6.7 hrs) Dosing Recommendations Vancomycin dose: 1250 mg IV Q8hrs (infused over 1 hr) Estimated peak: 28.7 mcg/mL Estimated trough: 14.8 mcg/mL Estimated AUC:RAÚL: 503 mcg*hr/mL (assumed RAÚL 1 mcg/mL) A/P:  
1. Recommend vancomycin 1250 mg IV Q8hrs (12 mg/kg) 2. Consider a vancomycin trough level prior to the 4th dose. 3. Please monitor renal function (urine output, BUN/SCr). Dose adjustments may be necessary with a significant change in renal function.

## 2019-05-20 NOTE — WOUND CARE
Wound consult noted, wound care will be by to apply wound vac this afternoon. Awaiting ID consult, noted in chart that pt has been having excessive bleeding, wound vac contraindicated with excessive bleeding, will evaluate safety of application at time of assessment.

## 2019-05-20 NOTE — WOUND CARE
Wound Care Progress Note Follow-up visit for additional consult for \"abdominal wound, reconsider wound vac\" Assessment:  
Communication: A&O x 4 communicative Continent, utilizing purewick due to difficulty ambulating r/t wound Independently repositions Diet: NPO Complaints of pain to right side of wound, states left side is numb. Bilateral heel, buttocks, and sacral skin intact and without erythema. Status post I&D of abscess to labia 1. POA abdominal wound, soft tissue necrosis (wound location & etiology) = 5 x 25 x 0.3 cm. Base is 75% of slough/necrotic tissue, 25% granulation tissue. Small serosanguinous  exudate. Periwound  with erythema. Margins are attached. Treatment: ABO, saline moist gauze and ABD pads Wound Recommendations:   
Daily santyl dressings, discussed case with pt, RN, PA and MD, pt currently not candidate for wound vac due to > 50% slough/necrosis in wound bed and documented episodes of bleeding. Pt also not candidate for home vac, arrived to hospital with vac via from wound clinic, as pts insurance does not have DM coverage per [employer] authorization performed PTA. Skin Care & Pressure Relief Recommendations: 
Minimize layers of linen/pads under patient to optimize support surface. Turn/reposition approximately every 2 hours and offload heels pillows or Prevalon boots. Manage incontinence / promote continence; Proshield to buttocks and sacrum daily and as needed with incontinence care Discussed above plan with patient & Alice REYNA, [place] Sa, MD 
 
Transition of Care: Plan to follow weekly and per product recommendations while admitted to hospital. 
 
 
Superior aspect Right aspect Left aspect Medial aspect

## 2019-05-20 NOTE — PROGRESS NOTES
CM noted pt with a planned CT of the abd and an ID consult pending. Request has been sent for cost out for pt potential finical responsibility in the event pt will require IVABX. CM to continue to follow and assist with transition of care. Care Management Interventions PCP Verified by CM: Yes([facility]) Mode of Transport at Discharge: Other (see comment)() Transition of Care Consult (CM Consult): Discharge Planning Current Support Network: Lives with Spouse Confirm Follow Up Transport: Family Plan discussed with Pt/Family/Caregiver:  Yes

## 2019-05-21 LAB
ANION GAP SERPL CALC-SCNC: 6 MMOL/L (ref 3–18)
BUN SERPL-MCNC: 4 MG/DL (ref 7–18)
BUN/CREAT SERPL: 7 (ref 12–20)
CALCIUM SERPL-MCNC: 8.7 MG/DL (ref 8.5–10.1)
CHLORIDE SERPL-SCNC: 105 MMOL/L (ref 100–108)
CO2 SERPL-SCNC: 29 MMOL/L (ref 21–32)
CREAT SERPL-MCNC: 0.58 MG/DL (ref 0.6–1.3)
DATE LAST DOSE: NORMAL
ERYTHROCYTE [DISTWIDTH] IN BLOOD BY AUTOMATED COUNT: 12.6 % (ref 11.6–14.5)
GLUCOSE SERPL-MCNC: 87 MG/DL (ref 74–99)
HCT VFR BLD AUTO: 35.4 % (ref 35–45)
HGB BLD-MCNC: 11.3 G/DL (ref 12–16)
MCH RBC QN AUTO: 26.7 PG (ref 24–34)
MCHC RBC AUTO-ENTMCNC: 31.9 G/DL (ref 31–37)
MCV RBC AUTO: 83.7 FL (ref 74–97)
PLATELET # BLD AUTO: 461 K/UL (ref 135–420)
PMV BLD AUTO: 9.2 FL (ref 9.2–11.8)
POTASSIUM SERPL-SCNC: 3.9 MMOL/L (ref 3.5–5.5)
RBC # BLD AUTO: 4.23 M/UL (ref 4.2–5.3)
REPORTED DOSE,DOSE: NORMAL UNITS
REPORTED DOSE/TIME,TMG: 600
SODIUM SERPL-SCNC: 140 MMOL/L (ref 136–145)
VANCOMYCIN TROUGH SERPL-MCNC: 13.2 UG/ML (ref 10–20)
WBC # BLD AUTO: 8.5 K/UL (ref 4.6–13.2)

## 2019-05-21 PROCEDURE — 74011000250 HC RX REV CODE- 250: Performed by: FAMILY MEDICINE

## 2019-05-21 PROCEDURE — 74011250636 HC RX REV CODE- 250/636: Performed by: FAMILY MEDICINE

## 2019-05-21 PROCEDURE — 74011250637 HC RX REV CODE- 250/637: Performed by: PHYSICIAN ASSISTANT

## 2019-05-21 PROCEDURE — 36415 COLL VENOUS BLD VENIPUNCTURE: CPT

## 2019-05-21 PROCEDURE — 80048 BASIC METABOLIC PNL TOTAL CA: CPT

## 2019-05-21 PROCEDURE — 77030018836 HC SOL IRR NACL ICUM -A

## 2019-05-21 PROCEDURE — 74011250637 HC RX REV CODE- 250/637: Performed by: FAMILY MEDICINE

## 2019-05-21 PROCEDURE — 65270000029 HC RM PRIVATE

## 2019-05-21 PROCEDURE — 80202 ASSAY OF VANCOMYCIN: CPT

## 2019-05-21 PROCEDURE — 85027 COMPLETE CBC AUTOMATED: CPT

## 2019-05-21 RX ADMIN — LEVOTHYROXINE SODIUM 87.5 MCG: 25 TABLET ORAL at 05:50

## 2019-05-21 RX ADMIN — SODIUM CHLORIDE 125 ML/HR: 900 INJECTION, SOLUTION INTRAVENOUS at 04:03

## 2019-05-21 RX ADMIN — VANCOMYCIN HYDROCHLORIDE 1250 MG: 10 INJECTION, POWDER, LYOPHILIZED, FOR SOLUTION INTRAVENOUS at 16:22

## 2019-05-21 RX ADMIN — TRAMADOL HYDROCHLORIDE 50 MG: 50 TABLET, FILM COATED ORAL at 14:46

## 2019-05-21 RX ADMIN — SODIUM CHLORIDE 125 ML/HR: 900 INJECTION, SOLUTION INTRAVENOUS at 14:46

## 2019-05-21 RX ADMIN — TRAMADOL HYDROCHLORIDE 50 MG: 50 TABLET, FILM COATED ORAL at 20:46

## 2019-05-21 RX ADMIN — COLLAGENASE SANTYL: 250 OINTMENT TOPICAL at 14:31

## 2019-05-21 RX ADMIN — VANCOMYCIN HYDROCHLORIDE 1250 MG: 10 INJECTION, POWDER, LYOPHILIZED, FOR SOLUTION INTRAVENOUS at 20:37

## 2019-05-21 RX ADMIN — VANCOMYCIN HYDROCHLORIDE 1250 MG: 10 INJECTION, POWDER, LYOPHILIZED, FOR SOLUTION INTRAVENOUS at 05:50

## 2019-05-21 NOTE — PROGRESS NOTES
0735-received report from Chema Torres RN included SBAR MAR and Kardex. Shift summary-no change in pt status, medicated for pain with good result, dressing changed with santyl, wet to dry. Bedside and Verbal shift change report given to Chema Torres RN (oncoming nurse) by Pari Andrews RN (offgoing nurse). Report included the following information SBAR, Kardex and MAR.

## 2019-05-21 NOTE — PROGRESS NOTES
Hospitalist Progress Note    Patient: Clarkdale Ambar MRN: [id number]  CSN: [id number]    YOB: 1990  Age: 33 y.o. Sex: female    DOA: 5/17/2019 LOS:  LOS: 4 days          Chief Complaint:    Abdominal pain    Assessment/Plan     1. Surgical site infection status post abdominoplasty  2. Dehiscence of surgical incision  3. MRSA infection  4. Labial abscess    1. Continue current antibiotic regimen with vancomycin. Per ID, AFB cultures sent from abdominal wound to evaluate for non-tuberculous mycobacterial infection. Await further recommendations from Dr Keren Baldwin regarding antibiotic choice and duration. 2. Management as above. 3. Continue vancomycin, ID following for recommendations and guidance of antibiotic selection. 4. On appropriate abx. No changes. DVT prophylaxis with sequential compression devices. Disposition: Await further recommendations from ID, patient remains on IV abx. Anticipate another 1-2 days of hospitalization unless otherwise recommended by Dr Keren Baldwin. Patient Active Problem List   Diagnosis Code    Status post abdominoplasty Z98.890    Dehiscence of closure of subcutaneous tissue, initial encounter T81.31XA    Dehiscence of external surgical wound T81. 31XA    Wound infection T14. 8XXA, L08.9    SIRS (systemic inflammatory response syndrome) (HCC) R65.10    MRSA infection A49.02       Subjective:    Feeling well, no new complaints or concerns. Hopeful to discharge soon. Review of systems:    Constitutional: denies fevers, chills, myalgias  Respiratory: denies SOB, cough  Cardiovascular: denies chest pain, palpitations  Gastrointestinal: denies nausea, vomiting, diarrhea      Vital signs/Intake and Output:  Visit Vitals  /80 (BP 1 Location: Left arm, BP Patient Position: At rest)   Pulse 72   Temp 98.3 °F (36.8 °C)   Resp 17   Ht 5' 6\" (1.676 m)   Wt 102.1 kg (225 lb)   SpO2 97%   Breastfeeding?  No   BMI 36.32 kg/m²     Current Shift:  05/21 0701 - 05/21 1900  In: -   Out: 300 [Urine:300]  Last three shifts:  05/19 1901 - 05/21 0700  In: 2769 [P.O.:240; I.V.:2529]  Out: 3550 [Urine:3550]    Exam:    General: Obese female, alert, NAD, OX3  Head/Neck: NCAT, supple, No masses, No lymphadenopathy  CVS:Regular rate and rhythm, no M/R/G, S1/S2 heard, no thrill  Lungs:Clear to auscultation bilaterally, no wheezes, rhonchi, or rales  Abdomen: Soft, tender lower abdomen. Postoperative wound dehiscence with purulent slough. Extremities: No C/C/E, pulses palpable 2+  Skin:normal texture and turgor, no rashes, no lesions  Neuro:grossly normal , follows commands  Psych:appropriate                Labs: Results:       Chemistry Recent Labs     05/21/19  0800 05/20/19  0322 05/19/19  0345   GLU 87 90 77    141 140   K 3.9 3.8 3.7    107 109*   CO2 29 27 24   BUN 4* 6* 5*   CREA 0.58* 0.59* 0.55*   CA 8.7 8.8 8.4*   AGAP 6 7 7   BUCR 7* 10* 9*   AP  --  79 73   TP  --  6.4 5.8*   ALB  --  3.0* 2.8*   GLOB  --  3.4 3.0   AGRAT  --  0.9 0.9      CBC w/Diff Recent Labs     05/21/19  0800 05/20/19  0322 05/19/19  0345   WBC 8.5 8.8 7.1   RBC 4.23 4.16* 4.09*   HGB 11.3* 11.0* 10.9*   HCT 35.4 35.0 34.4*   * 435* 367      Cardiac Enzymes No results for input(s): CPK, CKND1, HAYDER in the last 72 hours. No lab exists for component: CKRMB, TROIP   Coagulation No results for input(s): PTP, INR, APTT in the last 72 hours. No lab exists for component: INREXT    Lipid Panel No results found for: CHOL, CHOLPOCT, CHOLX, CHLST, CHOLV, 582592, HDL, LDL, LDLC, DLDLP, 451829, VLDLC, VLDL, TGLX, TRIGL, TRIGP, TGLPOCT, CHHD, CHHDX   BNP No results for input(s): BNPP in the last 72 hours.    Liver Enzymes Recent Labs     05/20/19  0322   TP 6.4   ALB 3.0*   AP 79   SGOT 24      Thyroid Studies No results found for: T4, T3U, TSH, TSHEXT     Procedures/imaging: see electronic medical records for all procedures/Xrays and details which were not copied into this note but were reviewed prior to creation of [address]e PA-C

## 2019-05-21 NOTE — PROGRESS NOTES
Pharmacy Dosing Services: Vancomycin   SCr = 0.58  CrCl > 120 ml/min   WBC = 8.5  Afebrile  Trough level = 13.2  - Therapeutic for skin and soft tissue infection with goal of 10-15   Continue Vancomycin at current dose of 1250 mg IV q8h   Pharmacy to continue to follow and adjust dose as necessary  [facility] [phone]

## 2019-05-21 NOTE — PROGRESS NOTES
Infectious Disease Follow-up Note      Date of Admission: 5/17/2019     Date of Note:  5/21/2019      Summary:     34 y/o female with ulcerating lesion over surgical incision beginning 3-4 weeks post abdominoplasty in [facility] 4/4. H/o recurrent furunculosis axilla, legs, vagina. Wd cx + MRSA. No improvement on Bactrim/Keflex    Interval History:     Wounds less painful now but on analgesics. Not clear if responding to Vancomycin but this is doubtful. Current Antimicrobials: Prior Antimicrobials   Vancomycin 5/17 - 4        Assessment / Plan:      Post-operative wound infection  - ulcerating rather than suppurative. 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 Suspect rapidly growing NTM ie, M abscessus, M fortuitum  - does not appear to be a primary MRSA infection. -> AFB culture of wound drainage (not done yesterday) I am not confident this will successfully isolate any mycobacteria so have requested Dr. Stoney Leroy to arrange for skin biopsy for AFB, fungal culture and histopathology. 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 Sepsis  - mild.  Leukocytosis, low grade fever present on admission  - resolved     Hypothyroidism     H/o hernia repair        Microbiology:   5/13      wdcx MRSA, CONS, E faecalis  5/17      wdcx MRSA, CONS               blcx NGTD x 2     Lines / Catheters:   piv        Patient Active Problem List   Diagnosis Code    Status post abdominoplasty Z98.890    Dehiscence of closure of subcutaneous tissue, initial encounter T81.31XA    Dehiscence of external surgical wound T81. 31XA    Wound infection T14. 8XXA, L08.9    SIRS (systemic inflammatory response syndrome) ([facility]) R65.10    MRSA infection A49.02           Current Facility-Administered Medications   Medication Dose Route Frequency    collagenase (SANTYL) 250 unit/gram ointment   Topical DAILY    vancomycin (VANCOCIN) 1250 mg in  ml infusion  1,250 mg IntraVENous Q8H    acetaminophen (TYLENOL) tablet 650 mg  650 mg Oral Q6H PRN    lidocaine 4 % patch 1 Patch  1 Patch TransDERmal Q24H    butalbital-acetaminophen-caffeine (FIORICET, ESGIC) -40 mg per tablet 2 Tab  2 Tab Oral Q6H PRN    traMADol (ULTRAM) tablet 50 mg  50 mg Oral Q6H PRN    levothyroxine (SYNTHROID) tablet 87.5 mcg  87.5 mcg Oral 6am    0.9% sodium chloride infusion  125 mL/hr IntraVENous CONTINUOUS    Vancomycin- pharmacy to dose  1 Each Other Rx Dosing/Monitoring           Review of Systems   Constitutional: Negative. HENT: Negative. Eyes: Negative. Respiratory: Negative. Cardiovascular: Negative. Gastrointestinal: Negative. Genitourinary: Negative. Musculoskeletal: Negative. Objective:     Visit Vitals  BP 97/56 (BP 1 Location: Left arm, BP Patient Position: At rest)   Pulse 72   Temp 98.6 °F (37 °C)   Resp 17   Ht 5' 6\" (1.676 m)   Wt 102.1 kg (225 lb)   LMP 2019   SpO2 98%   Breastfeeding? No   BMI 36.32 kg/m²       Temp (24hrs), Av.4 °F (36.9 °C), Min:98.1 °F (36.7 °C), Max:98.6 °F (37 °C)      General: Well developed, obese 33 y.o. female in no acute distress.   HEENT: no scleral icterus, no oral lesions  Neck: supple, symmetrical, trachea midline   Cardio:  regular rate and rhythm, S1, S2 normal, no murmur, click, rub or gallop  Lungs: clear to auscultation and no wheezes or rales  Abdomen: obese, soft, large areas of ulceration periumbilical, midline to hypogastric area (see photo)  Extremities:  no redness or tenderness in the calves or thighs, no edema Wound care RN photo 5/20          Lab results     Chemistry  Recent Labs     05/21/19  0800 05/20/19 0322 05/19/19  0345   GLU 87 90 77    141 140   K 3.9 3.8 3.7    107 109*   CO2 29 27 24   BUN 4* 6* 5*   CREA 0.58* 0.59* 0.55*   CA 8.7 8.8 8.4*   AGAP 6 7 7   BUCR 7* 10* 9*   AP  --  79 73   TP  --  6.4 5.8*   ALB  --  3.0* 2.8*   GLOB  --  3.4 3.0   AGRAT  --  0.9 0.9       CBC w/ Diff  Recent Labs     05/21/19  0800 05/20/19 0322 05/19/19 0345   WBC 8.5 8.8 7.1   RBC 4.23 4.16* 4.09*   HGB 11.3* 11.0* 10.9*   HCT 35.4 35.0 34.4*   * 435* 367       Microbiology  All Micro Results     Procedure Component Value Units Date/Time    CULTURE, ANAEROBIC [[id number]] Collected:  05/18/19 0540    Order Status:  Completed Specimen:  Wound Drainage Updated:  05/21/19 0821     Special Requests: NO SPECIAL REQUESTS        Culture result:       NO ANAEROBES ISOLATED 3 DAYS          CULTURE, BLOOD [[id number]] Collected:  05/17/19 1920    Order Status:  Completed Specimen:  Blood Updated:  05/21/19 0728     Special Requests: NO SPECIAL REQUESTS        Culture result: NO GROWTH 4 DAYS       CULTURE, BLOOD 2nd DRAW (required for [facility]) [[id number]] Collected:  05/17/19 1950    Order Status:  Completed Specimen:  Blood Updated:  05/21/19 0728     Special Requests: NO SPECIAL REQUESTS        Culture result: NO GROWTH 4 DAYS       AFB CULTURE + SMEAR W/RFLX ID FROM CULTURE [[id number]] Collected:  05/18/19 0540    Order Status:  Completed Updated:  05/20/19 1538    CULTURE, WOUND [place] Drone STAIN [[id number]]  (Abnormal)  (Susceptibility) Collected:  05/18/19 0540    Order Status:  Completed Specimen:  Wound from [facility] Updated:  05/20/19 0945     Special Requests: NO SPECIAL REQUESTS        GRAM STAIN RARE WBC'S         NO ORGANISMS SEEN        Culture result:       FEW **METHICILLIN RESISTANT STAPHYLOCOCCUS AUREUS**                  FEW STAPHYLOCOCCUS SPECIES, COAGULASE NEGATIVE            CALLED TO Sonia Castelan JANEL RN 3S BY [facility] AT [phone] ON [id number]    Dian Irvine Sever[place] Bridegroom STAIN [[id number]] Collected:  05/18/19 2994    Order Status:  Canceled Specimen:  Wound from [facility], Severiano Bridegroom STAIN [[id number]]     Order Status:  Canceled Specimen:  Wound from Dharmesh Pina MD, [facility]  Infectious Disease Specialist  Pager [phone]

## 2019-05-21 NOTE — PROGRESS NOTES
Notified pt does not have a benefit for home infusion. If IVABX are needed please consider daily treatment so pt can potentially go to [place]. CM continuing to follow and assist with transition of care needs. Care Management Interventions  PCP Verified by CM: Yes([place]  Mode of Transport at Discharge: Other (see comment)()  Transition of Care Consult (CM Consult): Discharge Planning  Current Support Network: Lives with Spouse  Confirm Follow Up Transport: Family  Plan discussed with Pt/Family/Caregiver:  Yes

## 2019-05-21 NOTE — PROGRESS NOTES
Request for skin bx for this patient. I have said in previous note this was a plastic surgery procedure and I do not do panniculectomy nor take care of post op complications and wound care for these patients. This procedure was not a hernia repair and mesh was not used per the patient, see previous progress note from me. In previous note I rec plastic surgery consult.

## 2019-05-21 NOTE — ROUTINE PROCESS
19:45: Received verbal/bedside change of shift report from DANIELLE Anand RN. Report included SBAR, KARDEX, MAR and recent results. 20:10: Pt received resting quietly in bed with family in attendance and in NAD. Respirations even and unlabored, skin W&D. IVF infusing as ordered. ABD dressing presents as CDI. Pt denies pain or drainage from surgery site, verbalized 'I walked to the bathroom before and felt fine, I think I'm dong so much better. \" Contact isolation maintained for MRSA to wound site. 22: 37: Pt requested and received PRN ultram 50 MG PO at this time for pain she rated as being 4/10 on numeric pain scale. 06:15: Pt slept soundly entire shift overnight and appears to be in NAD. No change noted to ABD DSD. Denies C/O at this time. Bedside, Verbal and Written shift change report given to Sarah Tinoco RN (oncoming nurse) by Joon Castro. Beth Shepard (offgoing nurse). Report included the following information SBAR, Kardex, MAR and Recent Results.

## 2019-05-22 ENCOUNTER — HOSPITAL ENCOUNTER (INPATIENT)
Dept: ULTRASOUND IMAGING | Age: 29
Discharge: HOME | DRG: 862 | End: 2019-05-22
Attending: PHYSICIAN ASSISTANT | Admitting: FAMILY MEDICINE
Payer: COMMERCIAL

## 2019-05-22 LAB
ANION GAP SERPL CALC-SCNC: 8 MMOL/L (ref 3–18)
APPEARANCE FLD: ABNORMAL
BUN SERPL-MCNC: 9 MG/DL (ref 7–18)
BUN/CREAT SERPL: 12 (ref 12–20)
CALCIUM SERPL-MCNC: 8.7 MG/DL (ref 8.5–10.1)
CHLORIDE SERPL-SCNC: 105 MMOL/L (ref 100–108)
CO2 SERPL-SCNC: 27 MMOL/L (ref 21–32)
COLOR FLD: YELLOW
CREAT SERPL-MCNC: 0.78 MG/DL (ref 0.6–1.3)
EOSINOPHIL NFR FLD MANUAL: 0 %
ERYTHROCYTE [DISTWIDTH] IN BLOOD BY AUTOMATED COUNT: 12.8 % (ref 11.6–14.5)
GLUCOSE SERPL-MCNC: 94 MG/DL (ref 74–99)
HCT VFR BLD AUTO: 35.4 % (ref 35–45)
HGB BLD-MCNC: 10.9 G/DL (ref 12–16)
LYMPHOCYTES NFR FLD: 5 %
MACROPHAGES NFR FLD: 2 %
MCH RBC QN AUTO: 25.5 PG (ref 24–34)
MCHC RBC AUTO-ENTMCNC: 30.8 G/DL (ref 31–37)
MCV RBC AUTO: 82.9 FL (ref 74–97)
MONOCYTES NFR FLD: 3 %
NEUTROPHILS NFR FLD: 90 %
NEUTS BAND # FLD: 0 %
NUC CELL # FLD: 3360 /CU MM
PLATELET # BLD AUTO: 421 K/UL (ref 135–420)
PMV BLD AUTO: 8.7 FL (ref 9.2–11.8)
POTASSIUM SERPL-SCNC: 4.2 MMOL/L (ref 3.5–5.5)
RBC # BLD AUTO: 4.27 M/UL (ref 4.2–5.3)
RBC # FLD: 5360 /CU MM
SODIUM SERPL-SCNC: 140 MMOL/L (ref 136–145)
SPECIMEN SOURCE FLD: ABNORMAL
WBC # BLD AUTO: 8.6 K/UL (ref 4.6–13.2)

## 2019-05-22 PROCEDURE — 0W9F30Z DRAINAGE OF ABDOMINAL WALL WITH DRAINAGE DEVICE, PERCUTANEOUS APPROACH: ICD-10-PCS | Performed by: RADIOLOGY

## 2019-05-22 PROCEDURE — 89051 BODY FLUID CELL COUNT: CPT

## 2019-05-22 PROCEDURE — 87116 MYCOBACTERIA CULTURE: CPT

## 2019-05-22 PROCEDURE — 80048 BASIC METABOLIC PNL TOTAL CA: CPT

## 2019-05-22 PROCEDURE — 85027 COMPLETE CBC AUTOMATED: CPT

## 2019-05-22 PROCEDURE — 36415 COLL VENOUS BLD VENIPUNCTURE: CPT

## 2019-05-22 PROCEDURE — 74011250636 HC RX REV CODE- 250/636: Performed by: PHYSICIAN ASSISTANT

## 2019-05-22 PROCEDURE — C1769 GUIDE WIRE: HCPCS

## 2019-05-22 PROCEDURE — 65270000029 HC RM PRIVATE

## 2019-05-22 PROCEDURE — 74011250636 HC RX REV CODE- 250/636: Performed by: FAMILY MEDICINE

## 2019-05-22 PROCEDURE — 87070 CULTURE OTHR SPECIMN AEROBIC: CPT

## 2019-05-22 PROCEDURE — 77030018836 HC SOL IRR NACL ICUM -A

## 2019-05-22 PROCEDURE — 74011000250 HC RX REV CODE- 250: Performed by: FAMILY MEDICINE

## 2019-05-22 PROCEDURE — 74011250637 HC RX REV CODE- 250/637: Performed by: FAMILY MEDICINE

## 2019-05-22 RX ORDER — LIDOCAINE HYDROCHLORIDE 10 MG/ML
10 INJECTION, SOLUTION EPIDURAL; INFILTRATION; INTRACAUDAL; PERINEURAL
Status: COMPLETED | OUTPATIENT
Start: 2019-05-22 | End: 2019-05-22

## 2019-05-22 RX ORDER — MORPHINE SULFATE 2 MG/ML
1 INJECTION, SOLUTION INTRAMUSCULAR; INTRAVENOUS
Status: DISCONTINUED | OUTPATIENT
Start: 2019-05-22 | End: 2019-05-23 | Stop reason: HOSPADM

## 2019-05-22 RX ORDER — LIDOCAINE HYDROCHLORIDE 10 MG/ML
5 INJECTION, SOLUTION EPIDURAL; INFILTRATION; INTRACAUDAL; PERINEURAL
Status: COMPLETED | OUTPATIENT
Start: 2019-05-22 | End: 2019-05-22

## 2019-05-22 RX ORDER — LIDOCAINE HYDROCHLORIDE 10 MG/ML
INJECTION, SOLUTION EPIDURAL; INFILTRATION; INTRACAUDAL; PERINEURAL
Status: COMPLETED
Start: 2019-05-22 | End: 2019-05-22

## 2019-05-22 RX ADMIN — MORPHINE SULFATE 1 MG: 2 INJECTION, SOLUTION INTRAMUSCULAR; INTRAVENOUS at 18:24

## 2019-05-22 RX ADMIN — LIDOCAINE HYDROCHLORIDE 10 ML: 10 INJECTION, SOLUTION EPIDURAL; INFILTRATION; INTRACAUDAL; PERINEURAL at 09:19

## 2019-05-22 RX ADMIN — COLLAGENASE SANTYL: 250 OINTMENT TOPICAL at 13:14

## 2019-05-22 RX ADMIN — LIDOCAINE HYDROCHLORIDE 5 ML: 10 INJECTION, SOLUTION EPIDURAL; INFILTRATION; INTRACAUDAL; PERINEURAL at 09:40

## 2019-05-22 RX ADMIN — TRAMADOL HYDROCHLORIDE 50 MG: 50 TABLET, FILM COATED ORAL at 03:55

## 2019-05-22 RX ADMIN — MORPHINE SULFATE 1 MG: 2 INJECTION, SOLUTION INTRAMUSCULAR; INTRAVENOUS at 13:12

## 2019-05-22 RX ADMIN — VANCOMYCIN HYDROCHLORIDE 1250 MG: 10 INJECTION, POWDER, LYOPHILIZED, FOR SOLUTION INTRAVENOUS at 13:23

## 2019-05-22 RX ADMIN — TRAMADOL HYDROCHLORIDE 50 MG: 50 TABLET, FILM COATED ORAL at 16:53

## 2019-05-22 RX ADMIN — LEVOTHYROXINE SODIUM 87.5 MCG: 25 TABLET ORAL at 06:54

## 2019-05-22 RX ADMIN — VANCOMYCIN HYDROCHLORIDE 1250 MG: 10 INJECTION, POWDER, LYOPHILIZED, FOR SOLUTION INTRAVENOUS at 06:21

## 2019-05-22 RX ADMIN — TRAMADOL HYDROCHLORIDE 50 MG: 50 TABLET, FILM COATED ORAL at 10:26

## 2019-05-22 RX ADMIN — VANCOMYCIN HYDROCHLORIDE 1250 MG: 10 INJECTION, POWDER, LYOPHILIZED, FOR SOLUTION INTRAVENOUS at 23:03

## 2019-05-22 RX ADMIN — MORPHINE SULFATE 1 MG: 2 INJECTION, SOLUTION INTRAMUSCULAR; INTRAVENOUS at 23:03

## 2019-05-22 NOTE — CDMP QUERY
Patient admitted with post-op infection. Noted documentation of  Sepis documented by ID and Sirs by Hospitalist 
 
If possible, please document in progress notes and d/c summary if you are evaluating and /or treating any of the following: Please provide clinical support for diagnose . (dx 1 - Sepsis confirmed and  (dx  Sir's*), ruled out 
(dx 2 - Sir's ) confirmed and  (dx Sepsis*) ruled out 
(dx 1 Sepsis) and (dx Sir's ) ruled out The medical record reflects the following: 
  Risk Factors: post-op infection Clinical Indicators: POA- WBC 14.2 with left shift. Abscess, wound infection MRSA Treatment: , vancomycin 1. At least 2 SIRS Criteria (Systemic Inflammatory Response Syndrome) (CMS) 
-Temp > 100.9 or < 96.8 
-HR > 90 
-RR > 20  
-WBC > 12,000 or < 4,000 or > 10% bands 2. Documented suspected or confirmed source of infection. (CMS) 3. Documented Organ Dysfunction by any ONE of the following. (CMS) 
     
 the CMS guidelines 
-AMS (from baseline if known) -SBP<90 or MAP<65 
-Documentation of respiratory failure and use of either invasive mechanical ventilation (intubation) or non-invasive mechanical ventilation (CPAP/BIPAP) -Creat. > 2.0 (with no history of Chronic Kidney Disease) -Bilirubin > 2 mg / dl (with no history of liver disease) -PLT < 100,000 (with no history of thrombocytopenia) -INR > 1.5 or aPTT > 60 sec (for patients not on Warfarin therapy) -Lactic Acid > 2 mmol/ L Thank- You Jamilah Avilez RN [address] [place] [address] [phone]

## 2019-05-22 NOTE — ROUTINE PROCESS
Ultrasound guided abscess drainage with drain placement in the anterior abdominal wall. 45 ml of fluid drained. Fluid taken to lab for testing. Transportation took patient back to room in stable condition.  Call to nurse was attempted at 10:28 am.

## 2019-05-22 NOTE — ROUTINE PROCESS
19:45: Received verbal/bedside change of shift report from Tanisha Ribeiro RN. Report included SBAR, KARDEX, MAR and recent results. 20:00: Pt received resting quietly and in NAD. Respirations even and unlabored, skin W&D. IVF/ABX infusing as ordered. ABD DSG presents as CDI and pt denies C/O discomfort. Monitoring ongoing. 03:45: Pt reports ambulating independently to BR when she felt some \"oozing\" to wound site. After returning to bed pt was noted to have had some breakthrough drainage to underlying gauze which had become dislodged and fell to floor while in BR. Nocturnist on board to collect culture from wound, afterwards it was cleansed with NS, blotted dry with DSD, santyl applied to nickel thickness and troweled in with sterile applicator and ordered. Monitoring ongoing. Bedside, Verbal and Written shift change report given to Tanisha Ribeiro RN (oncoming nurse) by Manuela Nunez. Ilya Singletary (offgoing nurse). Report included the following information SBAR, Kardex, MAR and Recent Results.

## 2019-05-22 NOTE — PROGRESS NOTES
Hospitalist Progress Note    Patient: Lima Ramos MRN: [id number]  CSN: [id number]    YOB: 1990  Age: 33 y.o. Sex: female    DOA: 5/17/2019 LOS:  LOS: 5 days          Chief Complaint:    Post op complication    Assessment/Plan     1. Surgical site infection status post abdominoplasty  2. Dehiscence of surgical incision  3. MRSA infection  4. Labial abscess    1. Appreciate ID assistance and recommendations. Patient remains on IV vancomycin while hospitalized. She underwent US guided drainage of possible seroma this morning by radiology. Specimen sent for AFB. Discussed case briefly with plastics, unfortunately they are not able to assist with this case. General surgery unable to assist as well. Per Dr Courtney Azul, patient would benefit from punch biopsy of wound edge. Will try to arrange outpatient dermatologist follow up for punch biopsy to send for AFB staining, culture and histopathology. Will need wound care follow up and ID follow up with Dr Courtney Azul. Anticipate patient can transition to PO Bactrim tomorrow AM and discharge tomorrow. 2. Management as above. 3. As above, IV vanc, transition to PO Bactrim tomorrow. 4. As above. DVT prophylaxis with sequential compression devices. Disposition: Anticipate transition to oral antibiotics and discharge in the AM. Will need wound care clinic and ID follow up appointments. CDMP - Sepsis due to abdominal wounds, sepsis has resolved. Patient Active Problem List   Diagnosis Code    Status post abdominoplasty Z98.890    Dehiscence of closure of subcutaneous tissue, initial encounter T81.31XA    Dehiscence of external surgical wound T81. 31XA    Wound infection T14. 8XXA, L08.9    SIRS (systemic inflammatory response syndrome) (HCC) R65.10    MRSA infection A49.02       Subjective:    Feeling better, hopeful to discharge soon. Pain from drainage.     Review of systems:    Constitutional: denies fevers, chills, myalgias  Respiratory: denies SOB, cough  Cardiovascular: denies chest pain, palpitations  Gastrointestinal: denies nausea, vomiting, diarrhea      Vital signs/Intake and Output:  Visit Vitals  /76   Pulse 78   Temp 98.2 °F (36.8 °C)   Resp 18   Ht 5' 6\" (1.676 m)   Wt 102.1 kg (225 lb)   SpO2 100%   Breastfeeding? No   BMI 36.32 kg/m²     Current Shift:  05/22 0701 - 05/22 1900  In: -   Out: 20 [Drains:20]  Last three shifts:  05/20 1901 - 05/22 0700  In: 0   Out: 3350 [Urine:3350]    Exam:    General: Obese female, alert, NAD, OX3  Head/Neck: NCAT, supple, No masses, No lymphadenopathy  CVS:Regular rate and rhythm, no M/R/G, S1/S2 heard, no thrill  Lungs:Clear to auscultation bilaterally, no wheezes, rhonchi, or rales  Abdomen: Soft, lower abdominal tenderness. Lower abdominal transverse wound with dehiscence and mild serous drainage. Dressings in place. Extremities: No C/C/E, pulses palpable 2+  Skin:normal texture and turgor, no rashes, no lesions  Neuro:grossly normal , follows commands  Psych:appropriate                Labs: Results:       Chemistry Recent Labs     05/22/19 0408 05/21/19  0800 05/20/19  0322   GLU 94 87 90    140 141   K 4.2 3.9 3.8    105 107   CO2 27 29 27   BUN 9 4* 6*   CREA 0.78 0.58* 0.59*   CA 8.7 8.7 8.8   AGAP 8 6 7   BUCR 12 7* 10*   AP  --   --  79   TP  --   --  6.4   ALB  --   --  3.0*   GLOB  --   --  3.4   AGRAT  --   --  0.9      CBC w/Diff Recent Labs     05/22/19 0408 05/21/19  0800 05/20/19  0322   WBC 8.6 8.5 8.8   RBC 4.27 4.23 4.16*   HGB 10.9* 11.3* 11.0*   HCT 35.4 35.4 35.0   * 461* 435*      Cardiac Enzymes No results for input(s): CPK, CKND1, HAYDER in the last 72 hours. No lab exists for component: CKRMB, TROIP   Coagulation No results for input(s): PTP, INR, APTT in the last 72 hours.     No lab exists for component: INREXT    Lipid Panel No results found for: CHOL, CHOLPOCT, CHOLX, CHLST, CHOLV, 081735, HDL, LDL, LDLC, DLDLP, 807516, VLDLC, VLDL, TGLX, TRIGL, TRIGP, TGLPOCT, CHHD, CHHDX   BNP No results for input(s): BNPP in the last 72 hours.    Liver Enzymes Recent Labs     05/20/19  0322   TP 6.4   ALB 3.0*   AP 79   SGOT 24      Thyroid Studies No results found for: T4, T3U, TSH, TSHEXT     Procedures/imaging: see electronic medical records for all procedures/Xrays and details which were not copied into this note but were reviewed prior to creation of [address], PA-C

## 2019-05-22 NOTE — PROGRESS NOTES
0725-received report from Marianna Benavides RN included SBAR MAR and Kardex. Shift summary-drain placed 55mL serous drainage over shift. Morphine 1 mg IV for pain with good result. Dressing changed with santyl and wet to dry. Bedside and Verbal shift change report given to Olga Yee RN (oncoming nurse) by Summer Colon RN (offgoing nurse). Report included the following information SBAR, Kardex and MAR.

## 2019-05-22 NOTE — PROGRESS NOTES
Infectious Disease Follow-up Note      Date of Admission: 5/17/2019     Date of Note:  5/22/2019      Summary:     32 y/o female with ulcerating lesion over surgical incision beginning 3-4 weeks post abdominoplasty in [facility] 4/4. H/o recurrent furunculosis axilla, legs, vagina. Wd cx + MRSA. No improvement on Bactrim/Keflex. ON vancomycin since 5/17. Wound AFB cx done 5/18. Seroma aspirated 5/22 - AFB, fungal cx sent. PRS refuses to see pts w/ infections complicating procedures done in [place]. Surgery refuses to do skin biopsy per PA. Interval History:     Had some pain from drainage catheter but resolved now. Afebrile. Current Antimicrobials: Prior Antimicrobials   Vancomycin 5/17 - 4        Assessment / Plan:      Post-operative wound infection  - ulcerating rather than suppurative. 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 Suspect rapidly growing NTM ie, M abscessus, M fortuitum  - does not appear to be a primary MRSA infection but may have secondary MRSA cellulitis  - PRS refuses to see pts w/ infections complicating procedures done in DR. Surgery refuses to do skin biopsy per PA  - periwound erythema appears to be less today ? response to vancomycin  - lower anterior abdomen fluid collection drain under US guidance - catheter left in place -> d/w PA Link.    -> arrange for OP Derm consult for punch biopsy (send for AFB, Fungal stains and culture, histopathology)  -> dc Vancomycin  -> continue MRSA rx with Bactrim DS bid 2 weeks  -> follow up with me at [facility] wound center in 2 weeks     Recurrent furunculosis  - likely with MRSA  - axillary, legs -> may (or may not) benefit from eventual MRSA de-colonization regimen after wounds have healed. Sepsis  - mild.  Leukocytosis, low grade fever present on admission  - resolved     Hypothyroidism     H/o hernia repair        Microbiology:         wdcx MRSA, CONS, E faecalis        wdcx MRSA, CONS               blcx NGTD x 2     Lines / Catheters:   piv    Patient Active Problem List   Diagnosis Code    Status post abdominoplasty Z98.890    Dehiscence of closure of subcutaneous tissue, initial encounter T81.31XA    Dehiscence of external surgical wound T81. 31XA    Wound infection T14. 8XXA, L08.9    SIRS (systemic inflammatory response syndrome) ([facility]) R65.10    MRSA infection A49.02       Current Facility-Administered Medications   Medication Dose Route Frequency    collagenase (SANTYL) 250 unit/gram ointment   Topical DAILY    vancomycin (VANCOCIN) 1250 mg in  ml infusion  1,250 mg IntraVENous Q8H    acetaminophen (TYLENOL) tablet 650 mg  650 mg Oral Q6H PRN    lidocaine 4 % patch 1 Patch  1 Patch TransDERmal Q24H    butalbital-acetaminophen-caffeine (FIORICET, ESGIC) -40 mg per tablet 2 Tab  2 Tab Oral Q6H PRN    traMADol (ULTRAM) tablet 50 mg  50 mg Oral Q6H PRN    levothyroxine (SYNTHROID) tablet 87.5 mcg  87.5 mcg Oral 6am    Vancomycin- pharmacy to dose  1 Each Other Rx Dosing/Monitoring       Review of Systems   Constitutional: Negative. HENT: Negative. Eyes: Negative. Respiratory: Negative. Cardiovascular: Negative. Gastrointestinal: Negative. Genitourinary: Negative. Musculoskeletal: Negative. Objective:     Visit Vitals  /76   Pulse 78   Temp 98.2 °F (36.8 °C)   Resp 18   Ht 5' 6\" (1.676 m)   Wt 102.1 kg (225 lb)   LMP 2019   SpO2 100%   Breastfeeding? No   BMI 36.32 kg/m²       Temp (24hrs), Av.4 °F (36.9 °C), Min:98.2 °F (36.8 °C), Max:98.6 °F (37 °C)      General: Well developed, obese 33 y.o. female in no acute distress.   HEENT: no scleral icterus, no oral lesions  Neck: supple, symmetrical, trachea midline   Cardio:  regular rate and rhythm, S1, S2 normal, no murmur, click, rub or gallop  Lungs: clear to auscultation and no wheezes or rales  Abdomen: obese, soft, large areas of ulceration periumbilical, midline to hypogastric area (see photo)  Extremities:  no redness or tenderness in the calves or thighs, no edema                            Wound care RN photo 5/20          Lab results     Chemistry  Recent Labs     05/22/19 0408 05/21/19  0800 05/20/19  0322   GLU 94 87 90    140 141   K 4.2 3.9 3.8    105 107   CO2 27 29 27   BUN 9 4* 6*   CREA 0.78 0.58* 0.59*   CA 8.7 8.7 8.8   AGAP 8 6 7   BUCR 12 7* 10*   AP  --   --  79   TP  --   --  6.4   ALB  --   --  3.0*   GLOB  --   --  3.4   AGRAT  --   --  0.9       CBC w/ Diff  Recent Labs     05/22/19 0408 05/21/19  0800 05/20/19  0322   WBC 8.6 8.5 8.8   RBC 4.27 4.23 4.16*   HGB 10.9* 11.3* 11.0*   HCT 35.4 35.4 35.0   * 461* 435*       Microbiology  All Micro Results     Procedure Component Value Units Date/Time    AFB CULTURE + SMEAR W/RFLX ID FROM CULTURE [[id number]] Collected:  05/22/19 0945    Order Status:  Completed Updated:  05/22/19 1052    CULTURE, BODY FLUID Brenda Angeles [[id number]] Collected:  05/22/19 0945    Order Status:  Completed Specimen:  Drainage Updated:  05/22/19 1051    CULTURE, ANAEROBIC [[id number]] Collected:  05/18/19 0540    Order Status:  Completed Specimen:  Wound Drainage Updated:  05/22/19 0730     Special Requests: NO SPECIAL REQUESTS        Culture result:       NO ANAEROBES ISOLATED 4 DAYS          CULTURE, BLOOD [[id number]] Collected:  05/17/19 1920    Order Status:  Completed Specimen:  Blood Updated:  05/22/19 0727     Special Requests: NO SPECIAL REQUESTS        Culture result: NO GROWTH 5 DAYS       CULTURE, BLOOD 2nd DRAW (required for [facility]) [[id number]] Collected:  05/17/19 1950    Order Status:  Completed Specimen:  Blood Updated:  05/22/19 0727     Special Requests: NO SPECIAL REQUESTS        Culture result: NO GROWTH 5 DAYS       AFB CULTURE + SMEAR W/RFLX ID FROM CULTURE [[id number]] Collected:  05/22/19 0455 Order Status:  Completed Updated:  05/22/19 0507    AFB CULTURE + SMEAR W/RFLX ID FROM CULTURE [[id number]] Collected:  05/18/19 0540    Order Status:  Completed Specimen:  Miscellaneous sample Updated:  05/21/19 1637     Source WOUND DRAINAGE        AFB Specimen processing Concentration     Acid Fast Smear NEGATIVE         Comment: (NOTE)  Performed At: [address], [address] [place] [address] [id number]  Alva Irwin MD QQ:[id number]          Acid Fast Culture PENDING    CULTURE, Saurabh Sea STAIN [[id number]]  (Abnormal)  (Susceptibility) Collected:  05/18/19 0540    Order Status:  Completed Specimen:  Wound from [facility] Updated:  05/20/19 0945     Special Requests: NO SPECIAL REQUESTS        GRAM STAIN RARE WBC'S         NO ORGANISMS SEEN        Culture result:       FEW **METHICILLIN RESISTANT STAPHYLOCOCCUS AUREUS**                  FEW STAPHYLOCOCCUS SPECIES, COAGULASE NEGATIVE            CALLED TO Maria R Au RN 3S BY [facility] AT 5109 ON [phone]    CULTURE, Saurabh Sea STAIN [[id number]] Collected:  05/18/19 0530    Order Status:  Canceled Specimen:  Wound from [address] [facility], Saurabh Sea STAIN [[id number]]     Order Status:  Canceled Specimen:  Wound from Coco Cavazos MD, [facility]  Infectious Disease Specialist  Pager [phone]

## 2019-05-22 NOTE — PROGRESS NOTES
Called by RN to evaluate wound and do AFB culture. Wound redressed w/ santyl.      Federico Henry MD  Nocturnist

## 2019-05-22 NOTE — BRIEF OP NOTE
BRIEF PROCEDURE NOTE      Ultrasound guided ventral abdominal wall fluid collection drain with catheter placement. Ventral abdominal wall collection is multiloculated on U/S eval. Left lateral approach with 5fr Yueh catheter revealed cloudy serous fluid with some debris. At this point I placed an Amplatz wire through the [place] catheter in an attempt to disrupt the septations. I elected to place a 10fr APDL catheter given the cloudy appearance and dehiscent wound. Unfortunately, I was only able to aspirate a total of 35 cc of cloudy serous fluid. A bulb was attached to the drainage catheter , which did yield some serous fluid aspirate.  -The aspirate was submitted to the lab per referring clinician. Recommend continued aspiration and flush of APDL catheter, per shift.      ..    Mar De Leon MD  Vascular & Interventional Radiology  [facility]  5/22/2019

## 2019-05-23 ENCOUNTER — APPOINTMENT (OUTPATIENT)
Dept: WOUND CARE | Age: 29
End: 2019-05-23
Attending: FAMILY MEDICINE
Payer: COMMERCIAL

## 2019-05-23 VITALS
HEART RATE: 71 BPM | SYSTOLIC BLOOD PRESSURE: 122 MMHG | HEIGHT: 66 IN | RESPIRATION RATE: 18 BRPM | OXYGEN SATURATION: 98 % | TEMPERATURE: 98.2 F | BODY MASS INDEX: 36.16 KG/M2 | DIASTOLIC BLOOD PRESSURE: 81 MMHG | WEIGHT: 225 LBS

## 2019-05-23 LAB
ANION GAP SERPL CALC-SCNC: 6 MMOL/L (ref 3–18)
BACTERIA SPEC CULT: NORMAL
BUN SERPL-MCNC: 9 MG/DL (ref 7–18)
BUN/CREAT SERPL: 12 (ref 12–20)
CALCIUM SERPL-MCNC: 8.7 MG/DL (ref 8.5–10.1)
CHLORIDE SERPL-SCNC: 104 MMOL/L (ref 100–108)
CO2 SERPL-SCNC: 30 MMOL/L (ref 21–32)
CREAT SERPL-MCNC: 0.74 MG/DL (ref 0.6–1.3)
ERYTHROCYTE [DISTWIDTH] IN BLOOD BY AUTOMATED COUNT: 12.8 % (ref 11.6–14.5)
GLUCOSE SERPL-MCNC: 107 MG/DL (ref 74–99)
HCT VFR BLD AUTO: 34 % (ref 35–45)
HGB BLD-MCNC: 10.6 G/DL (ref 12–16)
MCH RBC QN AUTO: 26 PG (ref 24–34)
MCHC RBC AUTO-ENTMCNC: 31.2 G/DL (ref 31–37)
MCV RBC AUTO: 83.3 FL (ref 74–97)
PLATELET # BLD AUTO: 415 K/UL (ref 135–420)
PMV BLD AUTO: 8.8 FL (ref 9.2–11.8)
POTASSIUM SERPL-SCNC: 4 MMOL/L (ref 3.5–5.5)
RBC # BLD AUTO: 4.08 M/UL (ref 4.2–5.3)
SERVICE CMNT-IMP: NORMAL
SODIUM SERPL-SCNC: 140 MMOL/L (ref 136–145)
WBC # BLD AUTO: 7 K/UL (ref 4.6–13.2)

## 2019-05-23 PROCEDURE — 74011250637 HC RX REV CODE- 250/637: Performed by: FAMILY MEDICINE

## 2019-05-23 PROCEDURE — 80048 BASIC METABOLIC PNL TOTAL CA: CPT

## 2019-05-23 PROCEDURE — 36415 COLL VENOUS BLD VENIPUNCTURE: CPT

## 2019-05-23 PROCEDURE — 85027 COMPLETE CBC AUTOMATED: CPT

## 2019-05-23 PROCEDURE — 74011000250 HC RX REV CODE- 250: Performed by: FAMILY MEDICINE

## 2019-05-23 PROCEDURE — 77030018836 HC SOL IRR NACL ICUM -A

## 2019-05-23 PROCEDURE — 74011250636 HC RX REV CODE- 250/636: Performed by: FAMILY MEDICINE

## 2019-05-23 PROCEDURE — 74011250636 HC RX REV CODE- 250/636: Performed by: PHYSICIAN ASSISTANT

## 2019-05-23 RX ORDER — SULFAMETHOXAZOLE AND TRIMETHOPRIM 800; 160 MG/1; MG/1
1 TABLET ORAL 2 TIMES DAILY
Qty: 28 TAB | Refills: 0 | Status: SHIPPED | OUTPATIENT
Start: 2019-05-23 | End: 2019-06-06

## 2019-05-23 RX ORDER — TRAMADOL HYDROCHLORIDE 50 MG/1
50 TABLET ORAL
Qty: 12 TAB | Refills: 0 | Status: SHIPPED | OUTPATIENT
Start: 2019-05-23 | End: 2019-05-26

## 2019-05-23 RX ADMIN — MORPHINE SULFATE 1 MG: 2 INJECTION, SOLUTION INTRAMUSCULAR; INTRAVENOUS at 11:26

## 2019-05-23 RX ADMIN — LEVOTHYROXINE SODIUM 87.5 MCG: 25 TABLET ORAL at 06:36

## 2019-05-23 RX ADMIN — VANCOMYCIN HYDROCHLORIDE 1250 MG: 10 INJECTION, POWDER, LYOPHILIZED, FOR SOLUTION INTRAVENOUS at 06:37

## 2019-05-23 RX ADMIN — TRAMADOL HYDROCHLORIDE 50 MG: 50 TABLET, FILM COATED ORAL at 01:16

## 2019-05-23 RX ADMIN — TRAMADOL HYDROCHLORIDE 50 MG: 50 TABLET, FILM COATED ORAL at 08:34

## 2019-05-23 RX ADMIN — COLLAGENASE SANTYL: 250 OINTMENT TOPICAL at 09:03

## 2019-05-23 RX ADMIN — MORPHINE SULFATE 1 MG: 2 INJECTION, SOLUTION INTRAMUSCULAR; INTRAVENOUS at 06:38

## 2019-05-23 NOTE — DISCHARGE SUMMARY
Discharge Summary    Patient: Marleni Day MRN: [id number]  CSN: [id number]    YOB: 1990  Age: 33 y.o. Sex: female    DOA: 5/17/2019 LOS:  LOS: 6 days   Discharge Date:      Primary Care Provider:  None    Admission Diagnoses: Wound infection [T14. 8XXA, L08.9]  MRSA infection [A49.02]  SIRS (systemic inflammatory response syndrome) ([facility] [R65.10]    Discharge Diagnoses:    Problem List as of 5/23/2019 Date Reviewed: 5/17/2019          Codes Class Noted - Resolved    * (Principal) Wound infection ICD-10-CM: T14. 8XXA, L08.9  ICD-9-CM: 958.3  5/17/2019 - Present        SIRS (systemic inflammatory response syndrome) ([facility]) ICD-10-CM: R65.10  ICD-9-CM: 995.90  5/17/2019 - Present        MRSA infection ICD-10-CM: A49.02  ICD-9-CM: 041.12  5/17/2019 - Present        Status post abdominoplasty ICD-10-CM: Z98.890  ICD-9-CM: V45.89  5/13/2019 - Present        Dehiscence of closure of subcutaneous tissue, initial encounter ICD-10-CM: T81.31XA  ICD-9-CM: 998.32  5/13/2019 - Present        Dehiscence of external surgical wound ICD-10-CM: T81.31XA  ICD-9-CM: 998.32  5/13/2019 - Present              Discharge Medications:     Current Discharge Medication List      START taking these medications    Details   traMADol (ULTRAM) 50 mg tablet Take 1 Tab by mouth every six (6) hours as needed for Pain for up to 3 days. Max Daily Amount: 200 mg. Qty: 12 Tab, Refills: 0    Associated Diagnoses: Infected wound         CONTINUE these medications which have CHANGED    Details   trimethoprim-sulfamethoxazole (BACTRIM DS) 160-800 mg per tablet Take 1 Tab by mouth two (2) times a day for 14 days. Qty: 28 Tab, Refills: 0         CONTINUE these medications which have NOT CHANGED    Details   levothyroxine (SYNTHROID) 88 mcg tablet Take 88 mcg by mouth Daily (before breakfast). magic mouthwash (PHILL) susp Take 5 mL by mouth every four (4) hours as needed.  Maalox, benadryl, lidocaine - NO NYSTATIN  Qty: 120 mL, Refills: 0 vit a-vit c-zinc-propolis (ZINC, WITH A AND C, LOZENGES) lozg Use lozenges as needed  Qty: 30 Lozenge, Refills: 0         STOP taking these medications       amoxicillin-clavulanate (AUGMENTIN) 875-125 mg per tablet Comments:   Reason for Stopping:               Discharge Condition: Stable    Procedures : US guided drainage of seroma    Consults: General Surgery and Infectious Disease      PHYSICAL EXAM    Visit Vitals  /73 (BP 1 Location: Right arm, BP Patient Position: At rest)   Pulse 72   Temp 98.1 °F (36.7 °C)   Resp 18   Ht 5' 6\" (1.676 m)   Wt 102.1 kg (225 lb)   SpO2 99%   Breastfeeding? No   BMI 36.32 kg/m²     General: Obese female. Awake, cooperative, no acute distress    HEENT: NC, Atraumatic. PERRLA, EOMI. Anicteric sclerae. Lungs:  CTA Bilaterally. No Wheezing/Rhonchi/Rales. Heart:  Regular  rhythm,  No murmur, No Rubs, No Gallops  Abdomen: Soft, postoperative wound dehiscence to lower abdomen. Mild serosanguinous drainage. Extremities: No c/c/e  Psych:   Not anxious or agitated. Neurologic:  No acute neurological deficits. Admission HPI : Chandana Fleming is a 33 y.o. female who had an umbilical hernia repair and abdominoplasty done on 4/4/2019 in [place]. She started to have wound dehiscence about 3 weeks ago and was seen in wound clinic. She was started on Augmentin and Bactrim yesterday given a positive wound culture for MRSA. Over the past day she has felt ill with fever and chills. She also has a furuncle on her R labia causing discomfort. She has a known history of MRSA colonization with history of soft tissue infections. Hospital Course : This patient was admitted to medical services on May 18, 2019 for sepsis due to postoperative wound dehiscence and complication following an abdominoplasty in the [facility]. Other admission diagnoses include a labial abscess and MRSA isolated from cultures.      The patient was admitted to the medical floor for continued care. A wound culture was sent for culture and returned with MRSA growth. The labial abscess was expressed and sent for culture, which was also positive for MRSA. Contact precautions were set in place. The patient was placed on IV vancomycin on admission and was monitored over the course of her stay. Wound care was consulted and placed a wound vac on the patient initially, eventually this was discontinued and the patient was placed on Santyl daily wet to dry dressing changes. General surgery was consulted, the patient was seen by Dr Malik Camacho who advised a plastic surgery consult and signed off. Infectious diseases was consulted and the patient was seen by Dr Breonna Byers. The patient had a CT abdomen and pelvis to evaluate for any deep abscesses, which returned only with findings of a possible seroma. Radiology was consulted to drain the seroma and send for AFB smear and culture. The AFB smear and culture are pending at the time of discharge. The patient will need outpatient infectious diseases follow up with Dr Breonna Byers, follow up in the wound care clinic, and has an appointment set with a dermatologist for possible punch biopsy after discharge. She will be discharged today on an oral Bactrim for another 14 days. Dr Breonna Byers has requested a follow up in 14 days in the clinic. She will need wound care shortly upon discharge, and a dermatology appointment has been set.      Activity: Activity as tolerated    Diet: Regular Diet    Follow-up: PCP; ID; Wound Care; Dermatology; Plastic Surgery    Disposition: Home    Minutes spent on discharge: 45       Labs: Results:       Chemistry Recent Labs     05/23/19 0420 05/22/19  0408 05/21/19  0800   * 94 87    140 140   K 4.0 4.2 3.9    105 105   CO2 30 27 29   BUN 9 9 4*   CREA 0.74 0.78 0.58*   CA 8.7 8.7 8.7   AGAP 6 8 6   BUCR 12 12 7*      CBC w/Diff Recent Labs     05/23/19  0420 05/22/19  0408 05/21/19  0800   WBC 7.0 8.6 8.5 RBC 4.08* 4.27 4.23   HGB 10.6* 10.9* 11.3*   HCT 34.0* 35.4 35.4    421* 461*      Cardiac Enzymes No results for input(s): CPK, CKND1, HAYDER in the last 72 hours. No lab exists for component: CKRMB, TROIP   Coagulation No results for input(s): PTP, INR, APTT in the last 72 hours. No lab exists for component: INREXT, INREXT    Lipid Panel No results found for: CHOL, CHOLPOCT, CHOLX, CHLST, CHOLV, 219768, HDL, LDL, LDLC, DLDLP, 513427, VLDLC, VLDL, TGLX, TRIGL, TRIGP, TGLPOCT, CHHD, CHHDX   BNP No results for input(s): BNPP in the last 72 hours. Liver Enzymes No results for input(s): TP, ALB, TBIL, AP, SGOT, GPT in the last 72 hours. No lab exists for component: DBIL   Thyroid Studies No results found for: T4, T3U, TSH, TSHEXT, TSHEXT         Significant Diagnostic Studies: Ct Abd Pelv W Cont    Result Date: 5/20/2019  EXAM: CT of the abdomen and pelvis INDICATION: Abdominal pain, possible surgical site infection. COMPARISON: None. TECHNIQUE: Axial CT imaging of the abdomen and pelvis was performed with intravenous contrast. Multiplanar reformats were generated. One or more dose reduction techniques were used on this CT: automated exposure control, adjustment of the mAs and/or kVp according to patient size, and iterative reconstruction techniques. The specific techniques used on this CT exam have been documented in the patient's electronic medical record. Digital Imaging and Communications in Medicine (DICOM) format image data are available to nonaffiliated external healthcare facilities or entities on a secure, media free, reciprocally searchable basis with patient authorization for at least a 12-month period after this study. _______________ FINDINGS: LOWER CHEST: Trace effusions. Dependent atelectasis. Marcela Peraza LIVER, BILIARY: Liver is normal. No biliary dilation. Gallbladder cholelithiasis otherwise unremarkable.  PANCREAS: Normal. SPLEEN: Normal. ADRENALS: Normal. KIDNEYS/URETERS/BLADDER: Normal. PELVIC ORGANS: Unremarkable. VASCULATURE: Unremarkable LYMPH NODES: No enlarged lymph nodes. GASTROINTESTINAL TRACT: No bowel dilation or wall thickening. BONES: No acute or aggressive osseous abnormalities identified. OTHER: Anterior lower abdominal surgical defect and underlying superficial fluid collection measuring approximately 12.3 x 3 cm in transverse dimension with small amount of rim enhancement. No gas internally. Adjacent subcutaneous edema noted. . _______________     IMPRESSION: Superficial nonspecific fluid collection could represent a seroma. No internal gas, but developing abscess not entirely excluded. Us Guide Drain Abs W Cath Perc    Result Date: 5/22/2019  EXAM: Ultrasound guided ventral lower abdominal wall aspiration and drainage INDICATION: Abnormal finding on CT of the abdomen and pelvis, surgical site infection with wound to his since following preceding abdominal plasty surgery COMPARISON: CT of the abdomen and pelvis from 5/20/2019 _______________ GUIDANCE: Ultrasound guidance was used to position (and confirm the position of) the needle, wire and APDL catheter. Image(s) saved in PACS: Ultrasound / Ultrasound and fluoroscopy / CT Technique/FINDINGS: Risks, benefits and alternatives were The patient to include bleeding, infection, injury to adjacent structures to include transabdominal puncture. Signed witnessed consent was obtained. Preprocedure evaluation demonstrates a ventral lower abdominal to pelvic multiseptated/multiloculated collection, corresponding to the preceding CT finding. A left lateral approach was chosen, per patient request. Overlying skin was marked and prepped in a sterile fashion. 1% lidocaine was used for local anesthesia. A small dermatotomy nick was made in the left lateral low pelvic soft tissues.  Under direct sonographic observation a 5 [place] Yueh catheter was placed into the fluid collection, the needle was removed and the catheter advanced. -At this point, only approximately 20 cc of cloudy serous fluid with minimal debris was aspirated. I elected to place a pigtail drainage catheter into this poorly draining loculated collection given the cloudy appearance. A stiff Amplatz wire was inserted through the indwelling catheter which was removed. Additional 1% lidocaine was administered for local anesthesia. A 10 [place] APDL catheter was placed under and, confirmed with ultrasound. The stiffener and the Amplatz wire removed and the pigtail was formed.   > I was only able to aspirate an additional 15 cc of cloudy serous fluid with minimal debris. A separate 10 cc sterile saline flush and aspirate was performed. The catheter was affixed to the patient's skin and a GEORGE drain bulb was attached. No immediate patient complication. The initial 20 cc and the follow on 15 cc aspirate were both sent to the laboratory for analysis per requesting physician. _______________     1. Successful ultrasound-guided percutaneous APDL drain placement with approximately 35 cc total aspiration of cloudy serous fluid with minimal debris. Xr Chest Port    Result Date: 5/18/2019  EXAM: XR CHEST PORT CLINICAL INDICATION/HISTORY: Fever and cough -Additional: None COMPARISON: None TECHNIQUE: Frontal view of the chest _______________ FINDINGS: HEART AND MEDIASTINUM: Normal cardiac size and mediastinal contours. LUNGS AND PLEURAL SPACES: No focal pneumonic consolidation, pneumothorax, or pleural effusion. BONY THORAX AND SOFT TISSUES: No acute osseous abnormality _______________     IMPRESSION: No acute findings in the chest.         No results found for this or any previous visit.         CC: None

## 2019-05-23 NOTE — ROUTINE PROCESS
Bedside shift change report given to Dori Iglesias RN (oncoming nurse) by Tasha Cordova RN (offgoing nurse). Report included the following information SBAR, Kardex, ED Summary, Procedure Summary, Intake/Output, MAR and Recent Results.

## 2019-05-23 NOTE — ROUTINE PROCESS
Bedside and Verbal shift change report given to DANIELLE Velasco (oncoming nurse) by Armida Aguilar RN (offgoing nurse). Report included the following information SBAR, Kardex, Intake/Output, MAR and Recent Results.

## 2019-05-23 NOTE — PROGRESS NOTES
Summary -- Pt comfortable throughout shift. Pain controlled to goal level with prescribed regimen and pt expresses confidence regarding ability to control pain via oral and nonpharm regimen while at home. Seen by TUTU Trujillo and Dr. Fide Antonio.  To discharge home with drain; familiar with care. Dressing changed per order, santyl applied. Care reviewed with pt. Pt to be discharged by AGNES Stewart RN.     Patient Vitals for the past 12 hrs:   Temp Pulse Resp BP SpO2   05/23/19 1059 98.2 °F (36.8 °C) 71 18 122/81 98 %   05/23/19 0753 98.1 °F (36.7 °C) 72 18 122/73 99 %   05/23/19 0301 98.2 °F (36.8 °C) 64 18 109/62 99 %

## 2019-05-23 NOTE — DISCHARGE INSTRUCTIONS
Patient Education     Infection After Surgery: Care Instructions  Your Care Instructions  After surgery, an infection is always possible. It doesn't mean that the surgery didn't go well. Because an infection can be serious, your doctor has taken steps to manage it. Your doctor checked the infection and cleaned it if necessary. He or she may have made an opening in the area so that the pus can drain out. You may have gauze in the cut so that the area will stay open and keep draining. You may need antibiotics. You will need to follow up with your doctor to make sure the infection has gone away. Follow-up care is a key part of your treatment and safety. Be sure to make and go to all appointments, and call your doctor if you are having problems. It's also a good idea to know your test results and keep a list of the medicines you take. How can you care for yourself at home? · Make sure your surgeon knows that you saw a doctor about the infection. · If your doctor prescribed antibiotics, take them as directed. Do not stop taking them just because you feel better. You need to take the full course of antibiotics. · Ask your doctor if you can take an over-the-counter pain medicine, such as acetaminophen (Tylenol), ibuprofen (Advil, Motrin), or naproxen (Aleve). Be safe with medicines. Read and follow all instructions on the label. · Do not take two or more pain medicines at the same time unless the doctor told you to. Many pain medicines have acetaminophen, which is Tylenol. Too much acetaminophen (Tylenol) can be harmful. · Prop up the area on a pillow anytime you sit or lie down during the next 3 days. Try to keep it above the level of your heart. This will help reduce swelling. · Keep the skin clean and dry. · If you have a bandage, keep it clean and dry. · You may have a dressing over the cut (incision). A dressing helps the incision heal and protects it. Your doctor will tell you how to take care of this. You can expect drainage from the wound. · If your doctor told you how to care for your incision, follow your doctor's instructions. If you did not get instructions, follow this general advice:  ? Wash around the incision with clean water 2 times a day. Don't use hydrogen peroxide or alcohol, which can slow healing. When should you call for help? Call your doctor now or seek immediate medical care if:    · You have signs that your infection is getting worse, such as:  ? Increased pain, swelling, warmth, or redness in the area. ? Red streaks leading from the area. ? Pus draining from the wound. ? A new or higher fever.    Watch closely for changes in your health, and be sure to contact your doctor if you have any problems. Where can you learn more? Go to http://reg-redd.info/. Enter C340 in the search box to learn more about \"Infection After Surgery: Care Instructions. \"  Current as of: September 23, 2018  Content Version: 11.9  © 7971-1375 [employer]. Care instructions adapted under license by [employer] (which disclaims liability or warranty for this information). If you have questions about a medical condition or this instruction, always ask your healthcare professional. [place] [address] any warranty or liability for your use of this information.

## 2019-05-23 NOTE — PROGRESS NOTES
Discharge instructions reviewed with the patient. Patient verbalized understanding and verified by teach back. All questions answered. IV discontinued, no redness, swelling or pain noted. Patient discharged off the unit via wheelchair. Patient armband removed and shredded.

## 2019-05-23 NOTE — PROGRESS NOTES
Noted pt has an appointment with dermatology scheduled. Details of this appointment have been noted in pt's AVS.  Noted plan transition to po ABX upon discharge. Please encourage ambulation. No other transition of care needs have been identified at this time. CM continuing to follow and assist as needed. Care Management Interventions  PCP Verified by CM: Yes([facility])  Mode of Transport at Discharge: Other (see comment)()  Transition of Care Consult (CM Consult): Discharge Planning  Current Support Network: Lives with Spouse  Confirm Follow Up Transport: Family  Plan discussed with Pt/Family/Caregiver:  Yes

## 2019-05-23 NOTE — PROGRESS NOTES
Shift summary: Pt medicated x 2 for pain level 8/10 described as \"burning\" and \"feels like something biting my insides. \" Able to rest in between medication.      Patient Vitals for the past 12 hrs:   Temp Pulse Resp BP SpO2   05/23/19 0301 98.2 °F (36.8 °C) 64 18 109/62 99 %   05/22/19 2239 98.7 °F (37.1 °C) 70 16 123/79 100 %   05/22/19 1856 98.5 °F (36.9 °C) 75 16 120/82 100 %

## 2019-05-27 LAB
BACTERIA SPEC CULT: NORMAL
GRAM STN SPEC: NORMAL
GRAM STN SPEC: NORMAL
SERVICE CMNT-IMP: NORMAL

## 2019-05-29 ENCOUNTER — HOSPITAL ENCOUNTER (OUTPATIENT)
Dept: INTERVENTIONAL RADIOLOGY/VASCULAR | Age: 29
Discharge: HOME | End: 2019-05-29
Attending: INTERNAL MEDICINE
Payer: COMMERCIAL

## 2019-05-29 ENCOUNTER — HOSPITAL ENCOUNTER (OUTPATIENT)
Dept: WOUND CARE | Age: 29
Discharge: HOME | End: 2019-05-29
Attending: FAMILY MEDICINE
Payer: COMMERCIAL

## 2019-05-29 ENCOUNTER — HOSPITAL ENCOUNTER (OUTPATIENT)
Dept: ULTRASOUND IMAGING | Age: 29
Discharge: HOME | End: 2019-05-29
Attending: INTERNAL MEDICINE
Payer: COMMERCIAL

## 2019-05-29 VITALS
SYSTOLIC BLOOD PRESSURE: 135 MMHG | RESPIRATION RATE: 14 BRPM | HEART RATE: 87 BPM | DIASTOLIC BLOOD PRESSURE: 80 MMHG | TEMPERATURE: 98.2 F | OXYGEN SATURATION: 100 %

## 2019-05-29 DIAGNOSIS — L08.9 WOUND INFECTION: ICD-10-CM

## 2019-05-29 DIAGNOSIS — T14.8XXA WOUND INFECTION: ICD-10-CM

## 2019-05-29 DIAGNOSIS — Z98.890 H/O DRAINAGE OF ABSCESS: ICD-10-CM

## 2019-05-29 PROCEDURE — 99211 OFF/OP EST MAY X REQ PHY/QHP: CPT

## 2019-05-29 PROCEDURE — 32552 REMOVE LUNG CATHETER: CPT

## 2019-05-29 NOTE — PROGRESS NOTES
Infectious Disease Follow-up Note      Date of Admission: 5/29/2019     Date of Note:  5/29/2019      Summary:      34 y/o female with ulcerating lesion over surgical incision beginning 3-4 weeks post abdominoplasty in [facility] 4/4. H/o recurrent furunculosis axilla, legs, vagina. Wd cx + MRSA. No improvement on Bactrim/Keflex. ON vancomycin since 5/17. Wound AFB cx done 5/18. Seroma aspirated 5/22 - AFB, fungal cx sent. PRS refuses to see pts w/ infections complicating procedures done in [place]. Surgery refuses to do skin biopsy per PA.       Interval History:      Says largest wound is deeper now with dressing changes but overall wounds feel better. No fevers at home.       Current Antimicrobials: Prior Antimicrobials   Bactrim 5/22 - 7  Vancomycin 5/17 - 5      Assessment / Plan:      Post-operative wound infection  - ulcerating rather than suppurative.  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  Suspect rapidly growing NTM ie, M abscessus, M fortuitum  - does not appear to be a primary MRSA infection but may have secondary MRSA cellulitis  - PRS refuses to see pts w/ infections complicating procedures done in [place]. Surgery refuses to do skin biopsy per PA  - mild periwound erythema resolved  - has Dermatology appt in few days and wound care appt tomorrow -> OP Derm consult for punch biopsy (send for AFB, Fungal stains and culture, histopathology)  -> continue MRSA rx with Bactrim DS bid to complete 2 weeks  -> follow up with me at [facility] wound center in 1  week  -> Radiology to evaluate today drain if can be removed (partially pulled out exposing one side hole). D/w Dr. Dasha Hall. Recurrent furunculosis  - likely with MRSA  - axillary, legs -> may (or may not) benefit from eventual MRSA de-colonization regimen after wounds have healed. Sepsis  - mild.  Leukocytosis, low grade fever present on admission  - resolved     Hypothyroidism     H/o hernia repair        Microbiology:         wdcx MRSA, CONS, E faecalis        wdcx MRSA, CONS               blcx NGTD x 2     Lines / Catheters:   piv          Patient Active Problem List   Diagnosis Code    Status post abdominoplasty Z98.890    Dehiscence of closure of subcutaneous tissue, initial encounter T81.31XA    Dehiscence of external surgical wound T81. 31XA    Wound infection T14. 8XXA, L08.9    SIRS (systemic inflammatory response syndrome) ([facility]) R65.10    MRSA infection A49.02           Current Outpatient Medications   Medication Sig Dispense    trimethoprim-sulfamethoxazole (BACTRIM DS) 160-800 mg per tablet Take 1 Tab by mouth two (2) times a day for 14 days. 28 Tab    levothyroxine (SYNTHROID) 88 mcg tablet Take 88 mcg by mouth Daily (before breakfast).  magic mouthwash (PHILL) susp Take 5 mL by mouth every four (4) hours as needed. Maalox, benadryl, lidocaine - NO NYSTATIN 120 mL    vit a-vit c-zinc-propolis (ZINC, WITH A AND C, LOZENGES) lozg Use lozenges as needed 30 Lozenge     No current facility-administered medications for this encounter. Review of Systems   Constitutional: Negative. HENT: Negative. Eyes: Negative. Respiratory: Negative. Cardiovascular: Negative. Gastrointestinal: Negative. Genitourinary: Negative. Musculoskeletal: Negative. Skin:        Wounds feel better   Neurological: Negative. Endo/Heme/Allergies: Negative. Psychiatric/Behavioral: Negative.              Objective:     Visit Vitals  /80 (BP 1 Location: Left arm, BP Patient Position: Sitting)   Pulse 87   Temp 98.2 °F (36.8 °C)   Resp 14   LMP 2019   SpO2 100%       Temp (24hrs), Av.2 °F (36.8 °C), Min:98.2 °F (36.8 °C), Max:98.2 °F (36.8 °C)      GEN:pleasant 34 y/o female in no distress  HEENT: anicteric sclerae  CHEST: no crackles, wheezes  CVS:regular, no murmurs  ABD: (see photo)  -improved compared with photo 5/20          EXT: no edema or cyanosis    Lab results     Chemistry  No results for input(s): GLU, NA, K, CL, CO2, BUN, CREA, CA, AGAP, BUCR, TBIL, GPT, AP, TP, ALB, GLOB, AGRAT in the last 72 hours. CBC w/ Diff  No results for input(s): WBC, RBC, HGB, HCT, PLT, GRANS, LYMPH, EOS, HGBEXT, HCTEXT, PLTEXT in the last 72 hours.     Microbiology  All Micro Results     None             Stephan French MD, [facility]  Infectious Disease Specialist  Pager [phone]

## 2019-05-29 NOTE — H&P
The patient is an appropriate candidate to undergo abscess drain eval/possible removal.    Patient assessed immediately prior to induction. Anesthesia plan as follows: Local/No Anesthesia. History and Physical update:  H&P was reviewed and the patient was examined. No changes have occurred in the patient's condition since the H&P was completed.     Abdon Breen MD  Vascular & Interventional Radiology  [facility]  5/29/2019

## 2019-05-29 NOTE — PROCEDURES
Vascular & Interventional Radiology Brief Procedure Note    Interventional Radiologist: Nannette Moya MD    Pre-operative Diagnosis:  Fluid collection    Post-operative Diagnosis: Same as pre-op dx    Procedure(s) Performed:  Catheter evaluation/drain removal.     Anesthesia:  Local and Moderate Sedation    Findings:  Limited US demonstrates no significant residual fluid. Existing catheter is near completely dislodged with maybe a couple of centimeters still internal.  Uncomplicated removal of pigtail drain. Complications: None    Estimated Blood Loss:  minimal    Tubes and Drains: None    Specimens: None    Condition: Good   Plan: f/u with wound care/infectious disease.        Nannette Moya MD  [address] [place] [address],[facility]  Vascular & Interventional Radiology  5/29/2019

## 2019-05-30 ENCOUNTER — HOSPITAL ENCOUNTER (OUTPATIENT)
Dept: WOUND CARE | Age: 29
Discharge: HOME | End: 2019-05-30
Attending: FAMILY MEDICINE
Payer: COMMERCIAL

## 2019-05-30 VITALS
OXYGEN SATURATION: 99 % | RESPIRATION RATE: 15 BRPM | HEART RATE: 98 BPM | DIASTOLIC BLOOD PRESSURE: 87 MMHG | SYSTOLIC BLOOD PRESSURE: 129 MMHG | TEMPERATURE: 98.1 F

## 2019-05-30 PROCEDURE — 97597 DBRDMT OPN WND 1ST 20 CM/<: CPT

## 2019-05-30 NOTE — LETTER
NOTIFICATION RETURN TO WORK / SCHOOL 
 
5/30/2019 4:10 PM 
 
Ms. Brigitte Morton [id number] [address] [place] [address] To Whom It May Concern: 
 
Brigitte Morton is currently under the care of [facility]  W Sultana Renteria. She will return to work/school on: 6/4/19. Pt has been out of work since she first saw me on 5/13/19. If there are questions or concerns please have the patient contact our office. Sincerely, Nikki Grant MD

## 2019-05-30 NOTE — PROGRESS NOTES
Patient presents to wound clinic for: Karina Marrufo is a 33 y.o. female whom presents for follow up of her post operative wound. She was recently hospitalized for SIRS and infected wound. ID is involved. A work up is still pending. She has no new complaints today. Review of Systems:    Gen: No fever, chills, malaise, weight loss/gain. GI: No nausea, vomiting, diarrhea, constipation, melena or hematochezia. Derm: see below  Musc/skeletal: no bone or joint complains. Vasc: No edema, cyanosis or claudication. Exam:   Visit Vitals  /87 (BP 1 Location: Left arm, BP Patient Position: Sitting)   Pulse 98   Temp 98.1 °F (36.7 °C)   Resp 15   SpO2 99%   Breastfeeding? No     Gen: Pleasant young female in NAD. Neuro: A+OX4. Motor grossly intact.      Wound Description:      05/30/19 1535   Wound Abdomen   Date First Assessed/Time First Assessed: 05/14/19 1519   Present on Hospital Admission: Yes  Wound Approximate Age at First Assessment (Weeks): 4 weeks  Primary Wound Type: Open incision/surgical site  Location: Abdomen   Dressing Status Breakthrough drainage   Dressing Type    (gauze, mextra, hypafix tape)   Incision Site Well Approximated No   Non-staged Wound Description Full thickness   Wound Length (cm) 5.5 cm   Wound Width (cm) 11.5 cm   Wound Depth (cm) 0.8 cm   Wound Volume (cm^3) 50.6 cm^3   Condition of Base Granulation;Slough   Condition of Edges Open   Assessment Painful   Tissue Type Percent Red 30   Tissue Type Percent Yellow 70   Drainage Amount Moderate   Drainage Color Serosanguinous   Wound Odor None   Cleansing and Cleansing Agents     (foam wash, vashe and barrier wipes)   Dressing Type Applied    (santyl, saline gauze, mextra, tape)       Debridement: required today to promote wound healing   Wound Procedure Type Debridement- Surgical   Procedure Time Out 1620   Consent Obtained  Yes   Procedure Bleeding Minimal   Procedure Hemostasis  Pressure   Type of Tissue Removed Devitalized, biofilm, slough, subcutaneous tissue   Procedure Instrument  Curette;Scissors; Other  (pick ups)   Procedure Pain Scale Numeric 0/10   Debridement Procedure Performed by Dr. Elizabeth Monte   Post-Procedure Length (cm) 5.5 cm   Post-Procedure Width (cm) 11.5 cm   Post-Procedure Depth (cm) 0.9 cm   Post-Procedure Volume (cm^3) 56.92 cm^3   Post-Procedure Surface Area (cm^2) 63.25 cm^2   Procedure Tolerated Fair           Assessment:    Patient Active Problem List   Diagnosis Code    Status post abdominoplasty Z98.890    Dehiscence of closure of subcutaneous tissue, initial encounter T81.31XA    Dehiscence of external surgical wound T81. 31XA    Wound infection T14. 8XXA, L08.9    SIRS (systemic inflammatory response syndrome) (HCC) R65.10    MRSA infection A49.02     The patient would benefit from a wound vac but her insurance doesn't cover DME. She is being followed by ID and needs to continue to follow up with them. Her wound actually looks fairly decent despite the size of the open wounds. Plan: 1. Continue abx per ID.  2. Serial debridements. 3. Dressing changes to include santyl or mesalt. 4. RTC in 1 week.       Axel Gunderson MD

## 2019-05-30 NOTE — WOUND CARE
05/30/19 1535   Wound Abdomen   Date First Assessed/Time First Assessed: 05/14/19 1519   Present on Hospital Admission: Yes  Wound Approximate Age at First Assessment (Weeks): 4 weeks  Primary Wound Type: Open incision/surgical site  Location: Abdomen   Dressing Status Breakthrough drainage   Dressing Type   (gauze, mextra, hypafix tape)   Incision Site Well Approximated No   Non-staged Wound Description Full thickness   Wound Length (cm) 5.5 cm   Wound Width (cm) 11.5 cm   Wound Depth (cm) 0.8 cm   Wound Volume (cm^3) 50.6 cm^3   Condition of Base Granulation;Slough   Condition of Edges Open   Assessment Painful   Tissue Type Percent Red 30   Tissue Type Percent Yellow 70   Drainage Amount Moderate   Drainage Color Serosanguinous   Wound Odor None   Cleansing and Cleansing Agents    (foam wash, vashe and barrier wipes)   Dressing Type Applied   (santyl, saline gauze, mextra, tape)   Wound Procedure Type Debridement- Surgical   Procedure Time Out 1620   Consent Obtained  Yes   Procedure Bleeding Minimal   Procedure Hemostasis  Pressure   Type of Tissue Removed  Devitalized   Procedure Instrument  Curette;Scissors; Other  (pick ups)   Procedure Pain Scale Numeric 0/10   Debridement Procedure Performed by Dr. Kory Hu   Post-Procedure Length (cm) 5.5 cm   Post-Procedure Width (cm) 11.5 cm   Post-Procedure Depth (cm) 0.9 cm   Post-Procedure Volume (cm^3) 56.92 cm^3   Post-Procedure Surface Area (cm^2) 63.25 cm^2   Procedure Tolerated Fair               Post debridement picture:

## 2019-06-03 ENCOUNTER — HOSPITAL ENCOUNTER (OUTPATIENT)
Dept: WOUND CARE | Age: 29
Discharge: HOME | End: 2019-06-03
Attending: FAMILY MEDICINE
Payer: COMMERCIAL

## 2019-06-03 VITALS
HEART RATE: 82 BPM | OXYGEN SATURATION: 99 % | DIASTOLIC BLOOD PRESSURE: 75 MMHG | RESPIRATION RATE: 17 BRPM | TEMPERATURE: 98.1 F | SYSTOLIC BLOOD PRESSURE: 127 MMHG

## 2019-06-03 PROCEDURE — 97597 DBRDMT OPN WND 1ST 20 CM/<: CPT

## 2019-06-03 PROCEDURE — 97598 DBRDMT OPN WND ADDL 20CM/<: CPT

## 2019-06-03 PROCEDURE — 11042 DBRDMT SUBQ TIS 1ST 20SQCM/<: CPT

## 2019-06-03 NOTE — WOUND CARE
06/03/19 1531   Wound Abdomen Right   Date First Assessed/Time First Assessed: 05/14/19 1519   Present on Hospital Admission: Yes  Wound Approximate Age at First Assessment (Weeks): 4 weeks  Primary Wound Type: Open incision/surgical site  Location: Abdomen  Wound Location Orientation: Right   Dressing Status   (no dressing)   Dressing Type   (no dressing)   Incision Site Well Approximated No   Non-staged Wound Description Full thickness   Wound Length (cm) 4.9 cm   Wound Width (cm) 6.2 cm   Wound Depth (cm) 1 cm   Wound Volume (cm^3) 30.38 cm^3   Condition of Base Adipose exposed;Granulation   Condition of Edges Open   Assessment Painful   Tissue Type Percent Red 30   Tissue Type Percent Yellow 70   Drainage Amount Scant   Drainage Color Serosanguinous   Wound Odor None   Samantha-wound Assessment Blanchable erythema   Cleansing and Cleansing Agents    (foam wash, vashe and barrier wipes)   Dressing Type Applied   (mesalt, mepilex border)   Wound Procedure Type Debridement- Surgical   Procedure Time Out 1525   Consent Obtained  Yes   Procedure Bleeding Minimal   Procedure Hemostasis  Pressure   Type of Tissue Removed  Devitalized   Procedure Instrument  Curette   Procedure Pain Scale Numeric 2/10   Debridement Procedure Performed by Dr. Jacqui Chase   Post-Procedure Length (cm) 4.9 cm   Post-Procedure Width (cm) 6.3 cm   Post-Procedure Depth (cm) 1 cm   Post-Procedure Volume (cm^3) 30.87 cm^3   Post-Procedure Surface Area (cm^2) 30.87 cm^2   Procedure Tolerated Well   Wound Abdomen Left   Date First Assessed/Time First Assessed: 06/03/19 1606   Primary Wound Type: Open incision/surgical site  Location: Abdomen  Wound Location Orientation: Left   Dressing Status   (no dressing)   Dressing Type   (no dressing)   Wound Length (cm) 2.8 cm   Wound Width (cm) 4.5 cm   Wound Depth (cm) 1.2 cm   Wound Volume (cm^3) 15.12 cm^3   Condition of Base Adipose exposed;Granulation   Condition of Edges Open   Tissue Type Percent Red 25 Tissue Type Percent Yellow 75   Drainage Amount Scant   Wound Odor None   Samantha-wound Assessment Blanchable erythema   Cleansing and Cleansing Agents    (foam wash, vashe, barrier wipes)   Dressing Changed Changed/New   Dressing Type Applied   (mesalt, mepilex border)   Wound Procedure Type Debridement- Surgical   Procedure Time Out 1525   Consent Obtained  Yes   Procedure Bleeding Minimal   Procedure Hemostasis  Pressure   Type of Tissue Removed  Devitalized   Procedure Instrument  Curette   Procedure Pain Scale Numeric 2/10   Debridement Procedure Performed by Dr. Moses Avalos   Post-Procedure Length (cm) 2.9 cm   Post-Procedure Width (cm) 4.5 cm   Post-Procedure Depth (cm) 1.2 cm   Post-Procedure Volume (cm^3) 15.66 cm^3   Post-Procedure Surface Area (cm^2) 13.05 cm^2   Procedure Tolerated Well               Post debridement picture:

## 2019-06-07 ENCOUNTER — HOSPITAL ENCOUNTER (OUTPATIENT)
Dept: WOUND CARE | Age: 29
Discharge: HOME | End: 2019-06-07
Attending: FAMILY MEDICINE
Payer: COMMERCIAL

## 2019-06-07 VITALS
TEMPERATURE: 98 F | OXYGEN SATURATION: 100 % | SYSTOLIC BLOOD PRESSURE: 128 MMHG | RESPIRATION RATE: 15 BRPM | HEART RATE: 91 BPM | DIASTOLIC BLOOD PRESSURE: 77 MMHG

## 2019-06-07 PROCEDURE — 99215 OFFICE O/P EST HI 40 MIN: CPT

## 2019-06-07 NOTE — WOUND CARE
06/07/19 1541 Wound Abdomen Left Date First Assessed/Time First Assessed: 06/03/19 1606   Primary Wound Type: Open incision/surgical site  Location: Abdomen  Wound Location Orientation: Left Dressing Status Removed Dressing Type Absorptive; Other (Comment) (Patient changed and removed) Non-staged Wound Description Full thickness Wound Length (cm) 2.7 cm Wound Width (cm) 2.2 cm Wound Depth (cm) 1.5 cm Wound Volume (cm^3) 8.91 cm^3 Condition of Base Adipose exposed;Slough;Granulation Condition of Edges Open;Rolled/curled Assessment Red; Swelling Undermining (cm) 0.5 cm Direction of Undermining 9 o'clock;3 o'clock Drainage Amount Moderate Drainage Color Serosanguinous; Tan  
Wound Odor None Samantha-wound Assessment Intact Cleansing and Cleansing Agents  Other (comment); Soap and water (vashe) Dressing Changed Changed/New Dressing Type Applied Other (Comment) (Mesalt, marathon to pw, exu-dry, mepilex border) Procedure Tolerated Well Wound Abdomen Right Date First Assessed/Time First Assessed: 05/14/19 1519   Present on Hospital Admission: Yes  Wound Approximate Age at First Assessment (Weeks): 4 weeks  Primary Wound Type: Open incision/surgical site  Location: Abdomen  Wound Location Orientation: Right Dressing Status Removed Dressing Type Absorptive; Other (Comment) (Patient changed and removed) Non-staged Wound Description Full thickness Shape irregular Wound Length (cm) 4.2 cm Wound Width (cm) 5.3 cm Wound Depth (cm) 2.4 cm Wound Volume (cm^3) 53.42 cm^3 Condition of Base Granulation; Adipose exposed;Slough Condition of Edges Rolled/curled; Open Assessment Painful Undermining (cm) 1.3 cm Direction of Undermining 10 o'clock;5 o'clock Drainage Amount Moderate Drainage Color Serosanguinous; Tan  
Wound Odor None Samantha-wound Assessment Intact Cleansing and Cleansing Agents  Other (comment); Soap and water (vashe) Dressing Changed Changed/New Dressing Type Applied Other (Comment) (Mesalt, marathon to pw, exu-dry, mepilex border) Procedure Tolerated Well

## 2019-06-07 NOTE — DISCHARGE INSTRUCTIONS
For Home Care/Self Care  Keep dressing dry and intact when bathing    Leave dressings in place until next visit    [facility]. Inspect your wounds, looking for signs of infection which may include the following:  Increase in redness  Red \"streaks\" from wound  Increase in swelling  Fever  Unusual odor  Change in the amount of wound drainage. Should you experience any significant changes in your wound(s) or have any questions regarding your home care instructions please contact the wound center or your home health company. If after regular business hours, please call your family doctor or local emergency room. Edema Control:   Elevate legs as much as possible. Avoid standing in one position for more than 10 minutes. Avoid setting with legs down. Do not cross legs while sitting. Off-Loading:     Frequent position changes. Do not cross legs while sitting. Shift weight every 20 minutes or more when sitting for prolonged periods of time.

## 2019-06-07 NOTE — PROGRESS NOTES
Patient presents to wound clinic for: Chandana Fleming is a 33 y.o. female whom presents follow up of her wound. She has no new complaints today. She feels discouraged by the slow pace of healing. Review of Systems:    Gen: No fever, chills, malaise, weight loss/gain. GI: No nausea, vomiting, diarrhea, constipation, melena or hematochezia. Derm: see below  Musc/skeletal: no bone or joint complains. Vasc: No edema, cyanosis or claudication. Exam:   Visit Vitals  /77 (BP 1 Location: Right arm, BP Patient Position: Sitting)   Pulse 91   Temp 98 °F (36.7 °C)   Resp 15   SpO2 100%   Breastfeeding? No     Gen: Pleasant female in NAD. Neuro: A+OX4, Motor grossly intact. Wound Description:      06/07/19 1541   Wound Abdomen Left   Date First Assessed/Time First Assessed: 06/03/19 1606   Primary Wound Type: Open incision/surgical site  Location: Abdomen  Wound Location Orientation: Left   Dressing Status Removed   Dressing Type Absorptive; Other (Comment)  (Patient changed and removed)   Non-staged Wound Description Full thickness   Wound Length (cm) 2.7 cm   Wound Width (cm) 2.2 cm   Wound Depth (cm) 1.5 cm   Wound Volume (cm^3) 8.91 cm^3   Condition of Base Adipose exposed;Slough;Granulation   Condition of Edges Open;Rolled/curled   Assessment Red; Swelling   Undermining (cm) 0.5 cm   Direction of Undermining 9 o'clock;3 o'clock   Drainage Amount Moderate   Drainage Color Serosanguinous; Tan   Wound Odor None   Samantha-wound Assessment Intact                   Wound Abdomen Right   Date First Assessed/Time First Assessed: 05/14/19 1519   Present on Hospital Admission: Yes  Wound Approximate Age at First Assessment (Weeks): 4 weeks  Primary Wound Type: Open incision/surgical site  Location: Abdomen  Wound Location Orientation: Right   Dressing Status Removed   Dressing Type Absorptive; Other (Comment)  (Patient changed and removed)   Non-staged Wound Description Full thickness   Shape irregular   Wound Length (cm) 4.2 cm   Wound Width (cm) 5.3 cm   Wound Depth (cm) 2.4 cm   Wound Volume (cm^3) 53.42 cm^3   Condition of Base Granulation; Adipose exposed;Slough   Condition of Edges Rolled/curled; Open   Assessment Painful   Undermining (cm) 1.3 cm   Direction of Undermining 10 o'clock;5 o'clock   Drainage Amount Moderate   Drainage Color Serosanguinous; Tan   Wound Odor None   Samantha-wound Assessment Intact       Debridement: not required today. Assessment:    Patient Active Problem List   Diagnosis Code    Status post abdominoplasty Z98.890    Dehiscence of closure of subcutaneous tissue, initial encounter T81.31XA    Dehiscence of external surgical wound T81. 31XA    Wound infection T14. 8XXA, L08.9    SIRS (systemic inflammatory response syndrome) (HCC) R65.10    MRSA infection A49.02       The wound has shown improvement since Monday. Will continue the current course. Plan: 1. Continue Serial Debridements  2. Dressings:  Cleansing and Cleansing Agents  Other (comment); Soap and water  (vashe)   Dressing Changed Changed/New   Dressing Type Applied Other (Comment)  (Mesalt, marathon to pw, exu-dry, mepilex border)   Procedure Tolerated Well       Shira Lazcano MD

## 2019-06-07 NOTE — PROGRESS NOTES
Patient presents to wound clinic for: Trent Hutchins is a 33 y.o. female whom presents for follow up of her post op wound. She has no new complaints today. She is managing the pain well. Review of Systems:    Gen: No fever, chills, malaise, weight loss/gain. GI: No nausea, vomiting, diarrhea, constipation, melena or hematochezia. Derm: see below  Musc/skeletal: no bone or joint complains. Vasc: No edema, cyanosis or claudication. Exam:   Visit Vitals  /75 (BP 1 Location: Right arm, BP Patient Position: Sitting)   Pulse 82   Temp 98.1 °F (36.7 °C)   Resp 17   SpO2 99%   Breastfeeding? No     Gen: Pleasant female in NAD. Neuro: A+OX4, Gross motor intact.      Wound Description:      06/03/19 1531   Wound Abdomen Right   Date First Assessed/Time First Assessed: 05/14/19 1519   Present on Hospital Admission: Yes  Wound Approximate Age at First Assessment (Weeks): 4 weeks  Primary Wound Type: Open incision/surgical site  Location: Abdomen  Wound Location Orientation: Right   Dressing Status    (no dressing)   Dressing Type    (no dressing)   Incision Site Well Approximated No   Non-staged Wound Description Full thickness   Wound Length (cm) 4.9 cm   Wound Width (cm) 6.2 cm   Wound Depth (cm) 1 cm   Wound Volume (cm^3) 30.38 cm^3   Condition of Base Adipose exposed;Granulation   Condition of Edges Open   Assessment Painful   Tissue Type Percent Red 30   Tissue Type Percent Yellow 70   Drainage Amount Scant   Drainage Color Serosanguinous   Wound Odor None   Samantha-wound Assessment Blanchable erythema                                                                       Wound Abdomen Left   Date First Assessed/Time First Assessed: 06/03/19 1606   Primary Wound Type: Open incision/surgical site  Location: Abdomen  Wound Location Orientation: Left   Dressing Status    (no dressing)   Dressing Type    (no dressing)   Wound Length (cm) 2.8 cm   Wound Width (cm) 4.5 cm   Wound Depth (cm) 1.2 cm   Wound Volume (cm^3) 15.12 cm^3   Condition of Base Adipose exposed;Granulation   Condition of Edges Open   Tissue Type Percent Red 25   Tissue Type Percent Yellow 75   Drainage Amount Scant   Wound Odor None   Samantha-wound Assessment Blanchable erythema       Debridement: required today to promote wound healing    Left Abdominal Area  Wound Procedure Type Debridement- Surgical   Procedure Time Out 1525   Consent Obtained  Yes   Procedure Bleeding Minimal   Procedure Hemostasis  Pressure   Type of Tissue Removed  Devitalized, biofilm, subcutaneous tissue   Procedure Instrument  Curette   Procedure Pain Scale Numeric 2/10   Debridement Procedure Performed by Dr. Jacqui Chase   Post-Procedure Length (cm) 2.9 cm   Post-Procedure Width (cm) 4.5 cm   Post-Procedure Depth (cm) 1.2 cm   Post-Procedure Volume (cm^3) 15.66 cm^3   Post-Procedure Surface Area (cm^2) 13.05 cm^2   Procedure Tolerated Well     Right Abdominal Area  Wound Procedure Type Debridement- Surgical   Procedure Time Out 1525   Consent Obtained  Yes   Procedure Bleeding Minimal   Procedure Hemostasis  Pressure   Type of Tissue Removed  Devitalized, biofilm, subcutaneous tissue   Procedure Instrument  Curette   Procedure Pain Scale Numeric 2/10   Debridement Procedure Performed by Dr. Jacqui Chase   Post-Procedure Length (cm) 4.9 cm   Post-Procedure Width (cm) 6.3 cm   Post-Procedure Depth (cm) 1 cm   Post-Procedure Volume (cm^3) 30.87 cm^3   Post-Procedure Surface Area (cm^2) 30.87 cm^2   Procedure Tolerated Well       Infection: Yes  Signs and Symptoms: mild drainage. Recent Would Culture results: MRSA  Assessment:    Patient Active Problem List   Diagnosis Code    Status post abdominoplasty Z98.890    Dehiscence of closure of subcutaneous tissue, initial encounter T81.31XA    Dehiscence of external surgical wound T81. 31XA    Wound infection T14. 8XXA, L08.9    SIRS (systemic inflammatory response syndrome) ([facility]) R65.10    MRSA infection A49.02     The wound is smaller and the wound edges look good. She is still on abx per ID. Plan: 1. Serial debridement  2. Continue ABX. 3. RTC in 1 week.      Princess Hoskins MD

## 2019-06-10 ENCOUNTER — HOSPITAL ENCOUNTER (OUTPATIENT)
Dept: WOUND CARE | Age: 29
Discharge: HOME | End: 2019-06-10
Attending: FAMILY MEDICINE
Payer: COMMERCIAL

## 2019-06-10 VITALS
OXYGEN SATURATION: 100 % | DIASTOLIC BLOOD PRESSURE: 91 MMHG | TEMPERATURE: 97.9 F | HEART RATE: 75 BPM | SYSTOLIC BLOOD PRESSURE: 142 MMHG | RESPIRATION RATE: 17 BRPM

## 2019-06-10 PROCEDURE — 11042 DBRDMT SUBQ TIS 1ST 20SQCM/<: CPT

## 2019-06-10 NOTE — DISCHARGE INSTRUCTIONS
For Home Care/Self Care  Keep dressing dry and intact when bathing    Leave dressings in place until next visit    [facility]. Inspect your wounds, looking for signs of infection which may include the following:  Increase in redness  Red \"streaks\" from wound  Increase in swelling  Fever  Unusual odor  Change in the amount of wound drainage. Should you experience any significant changes in your wound(s) or have any questions regarding your home care instructions please contact the wound center or your home health company. If after regular business hours, please call your family doctor or local emergency room. Edema Control:   Elevate legs as much as possible. Avoid standing in one position for more than 10 minutes. Avoid setting with legs down. Do not cross legs while sitting. Off-Loading:     Frequent position changes. Do not cross legs while sitting. Shift weight every 20 minutes or more when sitting for prolonged periods of time.

## 2019-06-10 NOTE — WOUND CARE
06/10/19 1613 Wound Abdomen Left Date First Assessed/Time First Assessed: 06/03/19 1606   Primary Wound Type: Open incision/surgical site  Location: Abdomen  Wound Location Orientation: Left Dressing Status Breakthrough drainage Dressing Type Absorptive (mepilex, exudry, mesalt) Non-staged Wound Description Full thickness Wound Length (cm) 2 cm Wound Width (cm) 1.5 cm Wound Depth (cm) 1.2 cm Wound Volume (cm^3) 3.6 cm^3 Condition of Base Adipose exposed;Slough;Granulation Condition of Edges Rolled/curled Assessment Swelling Tissue Type Percent Red 25 Tissue Type Percent Yellow 75 Undermining (cm) 1.3 cm Direction of Undermining 8 o'clock;9 o'clock Drainage Amount Moderate Drainage Color Serous; Yellow Wound Odor None Samantha-wound Assessment Fragile; Intact Margins Epibole (rolled edges) Cleansing and Cleansing Agents  Other (comment) (vashe) Dressing Changed Changed/New Dressing Type Applied Foam 
(mepilex border, exudry, marathon, mesalt) Wound Procedure Type Debridement- Surgical  
Procedure Time Out 3105 Consent Obtained  Yes Procedure Bleeding Minimal  
Procedure Hemostasis  N/A Type of Tissue Removed  Devitalized Procedure Instrument  Curette Procedure Pain Scale Numeric 5/10 Debridement Procedure Performed by Dr. Emily Bedoya Post-Procedure Length (cm) 2.1 cm Post-Procedure Width (cm) 1.6 cm Post-Procedure Depth (cm) 1.2 cm Post-Procedure Volume (cm^3) 4.03 cm^3 Post-Procedure Surface Area (cm^2) 3.36 cm^2 Closure None Procedure Tolerated Well Wound Abdomen Right Date First Assessed/Time First Assessed: 05/14/19 1519   Present on Hospital Admission: Yes  Wound Approximate Age at First Assessment (Weeks): 4 weeks  Primary Wound Type: Open incision/surgical site  Location: Abdomen  Wound Location Orientation: Right Dressing Status Breakthrough drainage Dressing Type Foam 
(mepilex border, exudry, mesalt, marathon) Non-staged Wound Description Full thickness Shape irregular Wound Length (cm) 3.5 cm Wound Width (cm) 5 cm Wound Depth (cm) 2.5 cm Wound Volume (cm^3) 43.75 cm^3 Condition of Base Granulation; Adipose exposed Condition of Edges Rolled/curled Assessment Swelling Tissue Type Percent Red 50 Tissue Type Percent Yellow 50 Undermining (cm) 1.2 cm Direction of Undermining 11 o'clock;3 o'clock Drainage Amount Moderate Drainage Color Serosanguinous Wound Odor None Samantha-wound Assessment Intact Margins Epibole (rolled edges) Cleansing and Cleansing Agents  Other (comment) (vashe) Dressing Changed Changed/New Dressing Type Applied Foam 
(mepilex border, exudry, mesalt, marathon ) Wound Procedure Type Debridement- Surgical  
Procedure Time Out 6231 Consent Obtained  Yes Procedure Bleeding Minimal  
Procedure Hemostasis  Pressure Type of Tissue Removed  Devitalized Procedure Instrument  Curette Procedure Pain Scale Numeric 5/10 Debridement Procedure Performed by Dr. Nima Gutierres Post-Procedure Length (cm) 3.6 cm Post-Procedure Width (cm) 5.1 cm Post-Procedure Depth (cm) 2.5 cm Post-Procedure Volume (cm^3) 45.9 cm^3 Post-Procedure Surface Area (cm^2) 18.36 cm^2 Closure None Procedure Tolerated Well

## 2019-06-10 NOTE — PROGRESS NOTES
Patient presents to wound clinic for: Ashish Ewing is a 33 y.o. female whom presents follow up of her post op wound. She has no new complaints. Review of Systems:    Gen: No fever, chills, malaise, weight loss/gain. GI: No nausea, vomiting, diarrhea, constipation, melena or hematochezia. Derm: see below  Musc/skeletal: no bone or joint complains. Vasc: No edema, cyanosis or claudication. Exam:   Visit Vitals  BP (!) 142/91 (BP 1 Location: Right arm, BP Patient Position: Sitting)   Pulse 75   Temp 97.9 °F (36.6 °C)   Resp 17   SpO2 100%     Gen: Upbeat and pleasant in NAD. Neuro: A+OX4, grossly intact. Wound Description:      06/10/19 1613   Wound Abdomen Left   Date First Assessed/Time First Assessed: 06/03/19 1606   Primary Wound Type: Open incision/surgical site  Location: Abdomen  Wound Location Orientation: Left   Dressing Status Breakthrough drainage   Dressing Type Absorptive  (mepilex, exudry, mesalt)   Non-staged Wound Description Full thickness   Wound Length (cm) 2 cm   Wound Width (cm) 1.5 cm   Wound Depth (cm) 1.2 cm   Wound Volume (cm^3) 3.6 cm^3   Condition of Base Adipose exposed;Slough;Granulation   Condition of Edges Rolled/curled   Assessment Swelling   Tissue Type Percent Red 25   Tissue Type Percent Yellow 75   Undermining (cm) 1.3 cm   Direction of Undermining 8 o'clock;9 o'clock   Drainage Amount Moderate   Drainage Color Serous; Yellow   Wound Odor None   Samantha-wound Assessment Fragile; Intact   Margins Epibole (rolled edges)                                                                               Wound Abdomen Right   Date First Assessed/Time First Assessed: 05/14/19 1519   Present on Hospital Admission: Yes  Wound Approximate Age at First Assessment (Weeks): 4 weeks  Primary Wound Type: Open incision/surgical site  Location: Abdomen  Wound Location Orientation: Right   Dressing Status Breakthrough drainage   Dressing Type Foam  (mepilex border, exudry, mesalt, marathon)   Non-staged Wound Description Full thickness   Shape irregular    Wound Length (cm) 3.5 cm   Wound Width (cm) 5 cm   Wound Depth (cm) 2.5 cm   Wound Volume (cm^3) 43.75 cm^3   Condition of Base Granulation; Adipose exposed   Condition of Edges Rolled/curled   Assessment Swelling   Tissue Type Percent Red 50   Tissue Type Percent Yellow 50   Undermining (cm) 1.2 cm   Direction of Undermining 11 o'clock;3 o'clock   Drainage Amount Moderate   Drainage Color Serosanguinous   Wound Odor None   Samantha-wound Assessment Intact   Margins Epibole (rolled edges)     Debridement: required today to promote wound healing   Abdominal Wound Right  Wound Procedure Type Debridement- Surgical   Procedure Time Out 1545   Consent Obtained  Yes   Procedure Bleeding Minimal   Procedure Hemostasis  Pressure   Type of Tissue Removed  Devitalized   Procedure Instrument  Curette   Procedure Pain Scale Numeric 5/10   Debridement Procedure Performed by Dr. Evelyne Walker    Post-Procedure Length (cm) 3.6 cm   Post-Procedure Width (cm) 5.1 cm   Post-Procedure Depth (cm) 2.5 cm   Post-Procedure Volume (cm^3) 45.9 cm^3   Post-Procedure Surface Area (cm^2) 18.36 cm^2   Closure None   Procedure Tolerated Well     Abdominal Wound Left   Wound Procedure Type Debridement- Surgical   Procedure Time Out 1545   Consent Obtained  Yes   Procedure Bleeding Minimal   Procedure Hemostasis  N/A   Type of Tissue Removed  Devitalized, slough   Procedure Instrument  Curette   Procedure Pain Scale Numeric 5/10   Debridement Procedure Performed by Dr. Evelyne Walker   Post-Procedure Length (cm) 2.1 cm   Post-Procedure Width (cm) 1.6 cm   Post-Procedure Depth (cm) 1.2 cm   Post-Procedure Volume (cm^3) 4.03 cm^3   Post-Procedure Surface Area (cm^2) 3.36 cm^2   Closure None   Procedure Tolerated Well     Assessment:    Patient Active Problem List   Diagnosis Code    Status post abdominoplasty Z98.890    Dehiscence of closure of subcutaneous tissue, initial encounter T81. 31XA    Dehiscence of external surgical wound T81. 31XA    Wound infection T14. 8XXA, L08.9    SIRS (systemic inflammatory response syndrome) (HCC) R65.10    MRSA infection A49.02     The patient is really improving. The measurements are very encouraging. There is no sign of infection. Plan: 1. Serial Debridements. 2. Dressings changes -   Cleansing and Cleansing Agents  Other (comment)  (vashe)   Dressing Changed Changed/New   Dressing Type Applied Foam  (mepilex border, exudry, mesalt, marathon )     3. RTC in 1 week.      Jorge Floyd MD

## 2019-06-13 ENCOUNTER — HOSPITAL ENCOUNTER (OUTPATIENT)
Dept: WOUND CARE | Age: 29
Discharge: HOME | End: 2019-06-13
Attending: FAMILY MEDICINE
Payer: COMMERCIAL

## 2019-06-13 VITALS
HEART RATE: 64 BPM | RESPIRATION RATE: 17 BRPM | OXYGEN SATURATION: 100 % | DIASTOLIC BLOOD PRESSURE: 64 MMHG | TEMPERATURE: 98.2 F | SYSTOLIC BLOOD PRESSURE: 112 MMHG

## 2019-06-13 PROBLEM — R65.10 SIRS (SYSTEMIC INFLAMMATORY RESPONSE SYNDROME) (HCC): Status: RESOLVED | Noted: 2019-05-17 | Resolved: 2019-06-13

## 2019-06-13 PROBLEM — M79.2 PAIN, NEUROPATHIC: Status: ACTIVE | Noted: 2019-06-13

## 2019-06-13 PROCEDURE — 99215 OFFICE O/P EST HI 40 MIN: CPT

## 2019-06-13 RX ORDER — GABAPENTIN 600 MG/1
600 TABLET ORAL
Qty: 14 TAB | Refills: 1 | Status: SHIPPED | OUTPATIENT
Start: 2019-06-13 | End: 2019-07-14

## 2019-06-13 NOTE — WOUND CARE
06/13/19 0910 Wound Abdomen Left Date First Assessed/Time First Assessed: 06/03/19 1606   Primary Wound Type: Open incision/surgical site  Location: Abdomen  Wound Location Orientation: Left Dressing Status Breakthrough drainage Dressing Type Absorptive; Other (Comment) (Patient changed this morning) Non-staged Wound Description Full thickness Shape irregular Wound Length (cm) 2 cm Wound Width (cm) 1.2 cm Wound Depth (cm) 1.6 cm Wound Volume (cm^3) 3.84 cm^3 Condition of Base Granulation; Adipose exposed Condition of Edges Rolled/curled Assessment Edema;Painful Undermining (cm) 0.5 cm Direction of Undermining 9 o'clock;3 o'clock Drainage Amount Moderate Drainage Color Serosanguinous; Tan  
Wound Odor None Samantha-wound Assessment Edema Cleansing and Cleansing Agents  Other (comment); Soap and water (vashe) Dressing Changed Changed/New Dressing Type Applied Other (Comment) (Jessie to UM, Mesalt, Exu-dry, Mepilex border) Procedure Tolerated Well Wound Abdomen Right Date First Assessed/Time First Assessed: 05/14/19 1519   Present on Hospital Admission: Yes  Wound Approximate Age at First Assessment (Weeks): 4 weeks  Primary Wound Type: Open incision/surgical site  Location: Abdomen  Wound Location Orientation: Right Dressing Status Clean, dry, and intact Dressing Type Absorptive Non-staged Wound Description Full thickness Shape irregular Wound Length (cm) 3.5 cm Wound Width (cm) 4.4 cm Wound Depth (cm) 2.4 cm Wound Volume (cm^3) 36.96 cm^3 Condition of Base Granulation; Adipose exposed Condition of Edges Open;Rolled/curled Assessment Red Undermining (cm) 1.5 cm Direction of Undermining 9 o'clock;2 o'clock Drainage Amount Moderate Drainage Color Serosanguinous; Serous Wound Odor None Samantha-wound Assessment Edema Cleansing and Cleansing Agents  Other (comment); Soap and water (vashe) Dressing Changed Changed/New Dressing Type Applied Other (Comment) (Jessie to UM, Mesalt, Exu-dry, Mepilex border) Procedure Tolerated Well

## 2019-06-13 NOTE — PROGRESS NOTES
Patient presents to wound clinic for: Berta Curry is a 33 y.o. female whom presents for dressing change today. She complains of pain in the wound. It occurs at night and is a pins and needles type pain. Review of Systems:    Gen: No fever, chills, malaise, weight loss/gain. GI: No nausea, vomiting, diarrhea, constipation, melena or hematochezia. Derm: see below  Musc/skeletal: no bone or joint complains. Vasc: No edema, cyanosis or claudication. Exam:   Visit Vitals  /64 (BP 1 Location: Right arm, BP Patient Position: At rest;Supine)   Pulse 64   Temp 98.2 °F (36.8 °C)   Resp 17   SpO2 100%   Breastfeeding? No     Gen: Pleasant female in NAD. Neuro: A+OX4. Wound Description:      06/13/19 0910   Wound Abdomen Left   Date First Assessed/Time First Assessed: 06/03/19 1606   Primary Wound Type: Open incision/surgical site  Location: Abdomen  Wound Location Orientation: Left   Dressing Status Breakthrough drainage   Dressing Type Absorptive; Other (Comment)  (Patient changed this morning)   Non-staged Wound Description Full thickness   Shape irregular   Wound Length (cm) 2 cm   Wound Width (cm) 1.2 cm   Wound Depth (cm) 1.6 cm   Wound Volume (cm^3) 3.84 cm^3   Condition of Base Granulation; Adipose exposed   Condition of Edges Rolled/curled   Assessment Edema;Painful   Undermining (cm) 0.5 cm   Direction of Undermining 9 o'clock;3 o'clock   Drainage Amount Moderate   Drainage Color Serosanguinous; Tan   Wound Odor None   Samantha-wound Assessment Edema           Wound Abdomen Right   Date First Assessed/Time First Assessed: 05/14/19 1519   Present on Hospital Admission: Yes  Wound Approximate Age at First Assessment (Weeks): 4 weeks  Primary Wound Type: Open incision/surgical site  Location: Abdomen  Wound Location Orientation: Right   Dressing Status Clean, dry, and intact   Dressing Type Absorptive   Non-staged Wound Description Full thickness   Shape irregular   Wound Length (cm) 3.5 cm   Wound Width (cm) 4.4 cm   Wound Depth (cm) 2.4 cm   Wound Volume (cm^3) 36.96 cm^3   Condition of Base Granulation; Adipose exposed   Condition of Edges Open;Rolled/curled   Assessment Red   Undermining (cm) 1.5 cm   Direction of Undermining 9 o'clock;2 o'clock   Drainage Amount Moderate   Drainage Color Serosanguinous; Serous   Wound Odor None   Samantha-wound Assessment Edema             Debridement: not required today. Assessment:    Patient Active Problem List   Diagnosis Code    Status post abdominoplasty Z98.890    Dehiscence of closure of subcutaneous tissue, initial encounter T81.31XA    Dehiscence of external surgical wound T81. 31XA    Wound infection T14. 8XXA, L08.9    MRSA infection A49.02    Pain, neuropathic M79.2     The patient continues to make amazing progress. She has a new problem today with some pain issues at the wound site. This is most likely neuropathic pain as the new nerve endings are regenerating. Plan: 1. Start Gabapentin qhs.    2. RTC on Monday for debridement.       Fredi Rollins MD

## 2019-06-13 NOTE — DISCHARGE INSTRUCTIONS
For Home Care/Self Care  Keep dressing dry and intact when bathing    Leave dressings in place until next visit    [facility]. Inspect your wounds, looking for signs of infection which may include the following:  Increase in redness  Red \"streaks\" from wound  Increase in swelling  Fever  Unusual odor  Change in the amount of wound drainage. Should you experience any significant changes in your wound(s) or have any questions regarding your home care instructions please contact the wound center or your home health company. If after regular business hours, please call your family doctor or local emergency room. Edema Control:   Elevate legs as much as possible. Avoid standing in one position for more than 10 minutes. Avoid setting with legs down. Do not cross legs while sitting. Off-Loading:     Frequent position changes. Do not cross legs while sitting. Shift weight every 20 minutes or more when sitting for prolonged periods of time.

## 2019-06-17 ENCOUNTER — HOSPITAL ENCOUNTER (OUTPATIENT)
Dept: WOUND CARE | Age: 29
Discharge: HOME | End: 2019-06-17
Attending: FAMILY MEDICINE
Payer: COMMERCIAL

## 2019-06-17 VITALS
TEMPERATURE: 98 F | OXYGEN SATURATION: 99 % | RESPIRATION RATE: 17 BRPM | SYSTOLIC BLOOD PRESSURE: 153 MMHG | DIASTOLIC BLOOD PRESSURE: 80 MMHG | HEART RATE: 69 BPM

## 2019-06-17 PROCEDURE — 11042 DBRDMT SUBQ TIS 1ST 20SQCM/<: CPT

## 2019-06-17 NOTE — PROGRESS NOTES
Patient presents to wound clinic for: Ishan Garcia is a 33 y.o. female whom presents for follow up of her wound. She has added a Wound Vitamin to her regimen and she is increasing her protein intake. She is encouraged by her progress. Review of Systems:    Gen: No fever, chills, malaise, weight loss/gain. GI: No nausea, vomiting, diarrhea, constipation, melena or hematochezia. Derm: see below  Musc/skeletal: no bone or joint complains. Vasc: No edema, cyanosis or claudication. Exam:   Gen: Pleasant female in NAD. Neuro: A+OX4, Motor grossly intact. Wound Description:      06/17/19 0954   Wound Abdomen Left   Date First Assessed/Time First Assessed: 06/03/19 1606   Primary Wound Type: Open incision/surgical site  Location: Abdomen  Wound Location Orientation: Left   Dressing Status Breakthrough drainage   Dressing Type Absorptive; Other (Comment)  (mesalt)   Non-staged Wound Description Full thickness   Shape irregular   Wound Length (cm) 1.6 cm   Wound Width (cm) 1.4 cm   Wound Depth (cm) 2 cm   Wound Volume (cm^3) 4.48 cm^3   Condition of Base Granulation; Adipose exposed;Slough   Condition of Edges Rolled/curled; Open   Assessment Intact;Edema   Undermining (cm) 0.6 cm   Direction of Undermining   (10-12; 2 o'clock)   Drainage Amount Moderate   Drainage Color Serosanguinous; Tan   Wound Odor None   Samantha-wound Assessment Dry   Cleansing and Cleansing Agents  Other (comment); Soap and water  (vashe)   Dressing Changed Changed/New   Dressing Type Applied Other (Comment)  (Tanyae to PW, manuela to undermining, mesalt, exu-dry, Mepil)   Wound Procedure Type Debridement- Surgical   Procedure Time Out 0910   Consent Obtained  Yes   Procedure Bleeding Moderate   Procedure Hemostasis  Pressure   Type of Tissue Removed  Devitalized   Procedure Instrument  Curette; Forceps   Procedure Pain Scale Numeric 2/10   Debridement Procedure Performed by Dr. Rai Lower   Post-Procedure Length (cm) 2 cm Post-Procedure Width (cm) 1.6 cm   Post-Procedure Depth (cm) 2 cm   Post-Procedure Volume (cm^3) 6.4 cm^3   Post-Procedure Surface Area (cm^2) 3.2 cm^2   Assisted Physician in Procedure  Yes   Procedure Tolerated Well   Wound Abdomen Right   Date First Assessed/Time First Assessed: 05/14/19 1519   Present on Hospital Admission: Yes  Wound Approximate Age at First Assessment (Weeks): 4 weeks  Primary Wound Type: Open incision/surgical site  Location: Abdomen  Wound Location Orientation: Right   Dressing Status Breakthrough drainage   Dressing Type Absorptive   Non-staged Wound Description Full thickness   Shape irregular   Wound Length (cm) 3.4 cm   Wound Width (cm) 3.7 cm   Wound Depth (cm) 2 cm   Wound Volume (cm^3) 25.16 cm^3   Condition of Base Granulation; Adipose exposed;Slough   Condition of Edges Open;Rolled/curled   Assessment Red;Pink   Undermining (cm) 1.4 cm   Direction of Undermining   (10-2 o'clock; 4-5 o'clock)   Drainage Amount Moderate   Drainage Color Serosanguinous; Tan   Wound Odor None   Samantha-wound Assessment Intact;Edema                   Debridement: required today to promote wound healing    Wound Procedure Type Debridement- Surgical   Procedure Time Out 0910   Consent Obtained  Yes   Procedure Bleeding Moderate   Procedure Hemostasis  Pressure   Type of Tissue Removed  Devitalized, dermis, epidermis, subcutaneous   Procedure Instrument  Curette   Procedure Pain Scale Numeric 2/10   Debridement Procedure Performed by Dr. Wilfredo Richmond   Post-Procedure Length (cm) 4 cm   Post-Procedure Width (cm) 4 cm   Post-Procedure Depth (cm) 2 cm   Post-Procedure Volume (cm^3) 32 cm^3   Post-Procedure Surface Area (cm^2) 16 cm^2   Assisted Physician in Procedure  Yes   Procedure Tolerated Well       Infection: No      Assessment:    Patient Active Problem List   Diagnosis Code    Status post abdominoplasty Z98.890    Dehiscence of closure of subcutaneous tissue, initial encounter T81.31XA    Dehiscence of external surgical wound T81. 31XA    Wound infection T14. 8XXA, L08.9    MRSA infection A49.02    Pain, neuropathic M79.2     The patient continues to make progress. Will continue with our current therapy. Plan: 1. Serial Debridements. 2. Dressings = twice weekly dressing change with manuela. Cleansing and Cleansing Agents  Other (comment); Soap and water  (vashe)   Dressing Changed Changed/New   Dressing Type Applied Other (Comment)  (Allkare to PW, manuela to um, mesalt to bed, exu-dry, mepilex)     3. RTC in 1 week. 4. Continue nutritional support.      Robin Quiñones MD

## 2019-06-17 NOTE — DISCHARGE INSTRUCTIONS
For Home Care/Self Care  Keep dressing dry and intact when bathing    Leave dressings in place until next visit    [facility]. Inspect your wounds, looking for signs of infection which may include the following:  Increase in redness  Red \"streaks\" from wound  Increase in swelling  Fever  Unusual odor  Change in the amount of wound drainage. Should you experience any significant changes in your wound(s) or have any questions regarding your home care instructions please contact the wound center or your home health company. If after regular business hours, please call your family doctor or local emergency room. Edema Control:   Elevate legs as much as possible. Avoid standing in one position for more than 10 minutes. Avoid setting with legs down. Do not cross legs while sitting. Off-Loading:     Frequent position changes. Do not cross legs while sitting. Shift weight every 20 minutes or more when sitting for prolonged periods of time.

## 2019-06-17 NOTE — WOUND CARE
06/17/19 7614 Wound Abdomen Left Date First Assessed/Time First Assessed: 06/03/19 1606   Primary Wound Type: Open incision/surgical site  Location: Abdomen  Wound Location Orientation: Left Dressing Status Breakthrough drainage Dressing Type Absorptive; Other (Comment) (mesalt) Non-staged Wound Description Full thickness Shape irregular Wound Length (cm) 1.6 cm Wound Width (cm) 1.4 cm Wound Depth (cm) 2 cm Wound Volume (cm^3) 4.48 cm^3 Condition of Base Granulation; Adipose exposed;Slough Condition of Edges Rolled/curled; Open Assessment Intact;Edema Undermining (cm) 0.6 cm Direction of Undermining  
(10-12; 2 o'clock) Drainage Amount Moderate Drainage Color Serosanguinous; Tan  
Wound Odor None Samantha-wound Assessment Dry Cleansing and Cleansing Agents  Other (comment); Soap and water (vashe) Dressing Changed Changed/New Dressing Type Applied Other (Comment) (Allkare to PW, manuela to undermining, mesalt, exu-dry, Mepil) Wound Procedure Type Debridement- Surgical  
Procedure Time Out 8154 Consent Obtained  Yes Procedure Bleeding Moderate Procedure Hemostasis  Pressure Type of Tissue Removed  Devitalized Procedure Instrument  Curette; Forceps Procedure Pain Scale Numeric 2/10 Debridement Procedure Performed by Dr. Amira Ruiz Post-Procedure Length (cm) 2 cm Post-Procedure Width (cm) 1.6 cm Post-Procedure Depth (cm) 2 cm Post-Procedure Volume (cm^3) 6.4 cm^3 Post-Procedure Surface Area (cm^2) 3.2 cm^2 Assisted Physician in Procedure  Yes Procedure Tolerated Well Wound Abdomen Right Date First Assessed/Time First Assessed: 05/14/19 1519   Present on Hospital Admission: Yes  Wound Approximate Age at First Assessment (Weeks): 4 weeks  Primary Wound Type: Open incision/surgical site  Location: Abdomen  Wound Location Orientation: Right Dressing Status Breakthrough drainage Dressing Type Absorptive Non-staged Wound Description Full thickness Shape irregular Wound Length (cm) 3.4 cm Wound Width (cm) 3.7 cm Wound Depth (cm) 2 cm Wound Volume (cm^3) 25.16 cm^3 Condition of Base Granulation; Adipose exposed;Slough Condition of Edges Open;Rolled/curled Assessment Red;Pink Undermining (cm) 1.4 cm Direction of Undermining  
(10-2 o'clock; 4-5 o'clock) Drainage Amount Moderate Drainage Color Serosanguinous; Tan  
Wound Odor None Samantha-wound Assessment Intact;Edema Cleansing and Cleansing Agents  Other (comment); Soap and water (vashe) Dressing Changed Changed/New Dressing Type Applied Other (Comment) (Allkare to PW, manuela to um, mesalt to bed, exu-dry, mepilex) Wound Procedure Type Debridement- Surgical  
Procedure Time Out 0924 Consent Obtained  Yes Procedure Bleeding Moderate Procedure Hemostasis  Pressure Type of Tissue Removed  Devitalized Procedure Instrument  Curette Procedure Pain Scale Numeric 2/10 Debridement Procedure Performed by Dr. Cata Bacon Post-Procedure Length (cm) 4 cm Post-Procedure Width (cm) 4 cm Post-Procedure Depth (cm) 2 cm Post-Procedure Volume (cm^3) 32 cm^3 Post-Procedure Surface Area (cm^2) 16 cm^2 Assisted Physician in Procedure  Yes Procedure Tolerated Well Post-debridement photo:

## 2019-06-20 ENCOUNTER — HOSPITAL ENCOUNTER (OUTPATIENT)
Dept: WOUND CARE | Age: 29
Discharge: HOME | End: 2019-06-20
Attending: FAMILY MEDICINE
Payer: COMMERCIAL

## 2019-06-20 VITALS
RESPIRATION RATE: 18 BRPM | SYSTOLIC BLOOD PRESSURE: 133 MMHG | HEART RATE: 76 BPM | OXYGEN SATURATION: 100 % | TEMPERATURE: 98.2 F | DIASTOLIC BLOOD PRESSURE: 83 MMHG

## 2019-06-20 PROCEDURE — 99211 OFF/OP EST MAY X REQ PHY/QHP: CPT

## 2019-06-20 NOTE — DISCHARGE INSTRUCTIONS
For Home Care/Self Care  Keep dressing dry and intact when bathing    Leave dressings in place until next visit    [facility]. Inspect your wounds, looking for signs of infection which may include the following:  Increase in redness  Red \"streaks\" from wound  Increase in swelling  Fever  Unusual odor  Change in the amount of wound drainage. Should you experience any significant changes in your wound(s) or have any questions regarding your home care instructions please contact the wound center or your home health company. If after regular business hours, please call your family doctor or local emergency room. Edema Control:   Elevate legs as much as possible. Avoid standing in one position for more than 10 minutes. Avoid setting with legs down. Do not cross legs while sitting. Off-Loading:     Frequent position changes. Do not cross legs while sitting. Shift weight every 20 minutes or more when sitting for prolonged periods of time.

## 2019-06-20 NOTE — WOUND CARE
06/20/19 1624 Wound Abdomen Left Date First Assessed/Time First Assessed: 06/03/19 1606   Primary Wound Type: Open incision/surgical site  Location: Abdomen  Wound Location Orientation: Left Dressing Status Breakthrough drainage Dressing Type Absorptive Non-staged Wound Description Full thickness Shape irregular Wound Length (cm) 1.4 cm Wound Width (cm) 1 cm Wound Depth (cm) 1.5 cm Wound Volume (cm^3) 2.1 cm^3 Condition of Base Granulation;Slough; Adipose exposed Condition of Edges Rolled/curled Assessment Intact;Edema Undermining (cm) 0.6 cm Direction of Undermining 9 o'clock;1 o'clock Drainage Amount Moderate Drainage Color Serosanguinous; Tan  
Wound Odor None Samantha-wound Assessment Intact Cleansing and Cleansing Agents  Other (comment); Soap and water (vashe) Dressing Changed Changed/New Dressing Type Applied Other (Comment) (Jessie to undermining, mesalt, allkare to pw, mepilex border) Procedure Tolerated Well Wound Abdomen Right Date First Assessed/Time First Assessed: 05/14/19 1519   Present on Hospital Admission: Yes  Wound Approximate Age at First Assessment (Weeks): 4 weeks  Primary Wound Type: Open incision/surgical site  Location: Abdomen  Wound Location Orientation: Right Dressing Status Breakthrough drainage Dressing Type Absorptive Non-staged Wound Description Full thickness Shape irregular Wound Length (cm) 2.6 cm Wound Width (cm) 3.7 cm Wound Depth (cm) 1.8 cm Wound Volume (cm^3) 17.32 cm^3 Condition of Base Granulation; Adipose exposed;Slough Condition of Edges Rolled/curled Assessment Red Undermining (cm) 0.8 cm Direction of Undermining 11 o'clock;4 o'clock;5 o'clock Drainage Amount Moderate Drainage Color Serosanguinous; Tan  
Wound Odor None Samantha-wound Assessment Intact Cleansing and Cleansing Agents  Other (comment); Soap and water (vashe) Dressing Changed Changed/New Dressing Type Applied Other (Comment) (Jessie to undermining, mesalt, allkare to pw, mepilex border) Procedure Tolerated Well

## 2019-06-24 ENCOUNTER — HOSPITAL ENCOUNTER (OUTPATIENT)
Dept: WOUND CARE | Age: 29
Discharge: HOME | End: 2019-06-24
Attending: FAMILY MEDICINE
Payer: COMMERCIAL

## 2019-06-24 VITALS
OXYGEN SATURATION: 100 % | SYSTOLIC BLOOD PRESSURE: 139 MMHG | HEART RATE: 66 BPM | TEMPERATURE: 98.3 F | DIASTOLIC BLOOD PRESSURE: 88 MMHG | RESPIRATION RATE: 17 BRPM

## 2019-06-24 PROCEDURE — 11042 DBRDMT SUBQ TIS 1ST 20SQCM/<: CPT

## 2019-06-24 NOTE — WOUND CARE
06/24/19 1608 Wound Abdomen Left Date First Assessed/Time First Assessed: 06/03/19 1606   Primary Wound Type: Open incision/surgical site  Location: Abdomen  Wound Location Orientation: Left Dressing Status Old drainage Dressing Type ABD pad Non-staged Wound Description Full thickness Shape irregular Wound Length (cm) 1 cm Wound Width (cm) 0.7 cm Wound Depth (cm) 1.5 cm Wound Volume (cm^3) 1.05 cm^3 Condition of [facility] Condition of Edges Open;Rolled/curled Assessment Intact Undermining (cm) 0.6 cm Direction of Undermining 11 o'clock Drainage Amount Moderate Drainage Color Serosanguinous; Tan  
Wound Odor None Samantha-wound Assessment Intact Margins Epibole (rolled edges) Cleansing and Cleansing Agents  Other (comment); Soap and water (vashe) Dressing Changed Changed/New Dressing Type Applied Other (Comment) (Jessie to UM, Mesalt, Exu-dry, Mepilex border) Wound Procedure Type Debridement- Surgical  
Procedure Time Out 6145 Consent Obtained  Yes Procedure Bleeding Moderate Procedure Hemostasis  Pressure Type of Tissue Removed  Devitalized 
(and vitalized) Procedure Instrument  Curette Procedure Pain Scale Numeric 0/10 Debridement Procedure Performed by Dr. George Azul Post-Procedure Length (cm) 1 cm Post-Procedure Width (cm) 0.7 cm Post-Procedure Depth (cm) 1.7 cm Post-Procedure Volume (cm^3) 1.19 cm^3 Post-Procedure Surface Area (cm^2) 0.7 cm^2 Procedure Tolerated Well Wound Abdomen Right Date First Assessed/Time First Assessed: 05/14/19 1519   Present on Hospital Admission: Yes  Wound Approximate Age at First Assessment (Weeks): 4 weeks  Primary Wound Type: Open incision/surgical site  Location: Abdomen  Wound Location Orientation: Right Dressing Status Old drainage Dressing Type ABD pad Non-staged Wound Description Full thickness Shape irregular Wound Length (cm) 3 cm Wound Width (cm) 3 cm Wound Depth (cm) 1.6 cm  
 Wound Volume (cm^3) 14.4 cm^3 Condition of Base Granulation;Slough; Adipose exposed Condition of Edges Rolled/curled Assessment Pink Undermining (cm) 1.6 cm Direction of Undermining 11 o'clock;4 o'clock;5 o'clock Drainage Amount Moderate Drainage Color Serosanguinous; Tan  
Wound Odor None Samantha-wound Assessment Intact Margins Epibole (rolled edges) Cleansing and Cleansing Agents  Other (comment); Soap and water (vashe) Dressing Changed Changed/New Dressing Type Applied Other (Comment) (Jessie to UM, Mesalt, Exu-dry, Mepilex border) Wound Procedure Type Debridement- Surgical  
Procedure Time Out 2311 Consent Obtained  Yes Procedure Bleeding Moderate Procedure Hemostasis  Pressure Type of Tissue Removed  Devitalized 
(and vitalized) Procedure Instrument  Curette Procedure Pain Scale Numeric 2/10 Debridement Procedure Performed by Dr. Patti Abebe Post-Procedure Length (cm) 3 cm Post-Procedure Width (cm) 3 cm Post-Procedure Depth (cm) 1.7 cm Post-Procedure Volume (cm^3) 15.3 cm^3 Post-Procedure Surface Area (cm^2) 9 cm^2 Procedure Tolerated Fair Post-debridement photo:

## 2019-06-24 NOTE — DISCHARGE INSTRUCTIONS
For Home Care/Self Care  Keep dressing dry and intact when bathing    Leave dressings in place until next visit    Patient to return for wound care in: [address] IF UNABLE TO [facility]. Inspect your wounds, looking for signs of infection which may include the following:  Increase in redness  Red \"streaks\" from wound  Increase in swelling  Fever  Unusual odor  Change in the amount of wound drainage. Should you experience any significant changes in your wound(s) or have any questions regarding your home care instructions please contact the wound center or your home health company. If after regular business hours, please call your family doctor or local emergency room. Edema Control:   Elevate legs as much as possible. Avoid standing in one position for more than 10 minutes. Avoid setting with legs down. Do not cross legs while sitting. Off-Loading:     Frequent position changes. Do not cross legs while sitting. Shift weight every 20 minutes or more when sitting for prolonged periods of time.

## 2019-06-27 ENCOUNTER — HOSPITAL ENCOUNTER (OUTPATIENT)
Dept: WOUND CARE | Age: 29
Discharge: HOME | End: 2019-06-27
Attending: FAMILY MEDICINE
Payer: COMMERCIAL

## 2019-06-27 PROCEDURE — 99211 OFF/OP EST MAY X REQ PHY/QHP: CPT

## 2019-06-27 NOTE — PROGRESS NOTES
Patient presents to wound clinic for: Beverly Blackwell is a 33 y.o. female whom presents follow up of her post op wound. She continues to improve. She has no new complaints. Review of Systems:    Gen: No fever, chills, malaise, weight loss/gain. GI: No nausea, vomiting, diarrhea, constipation, melena or hematochezia. Derm: see below  Musc/skeletal: no bone or joint complains. Vasc: No edema, cyanosis or claudication. Exam:   Visit Vitals  /88 (BP 1 Location: Right arm, BP Patient Position: At rest;Sitting)   Pulse 66   Temp 98.3 °F (36.8 °C)   Resp 17   SpO2 100%   Breastfeeding? No     Gen: Pleasant, upbeat  Neuro: A+OX4, Motor grossly intact. Wound Description:     06/24/19 1608    Wound Abdomen Left   Date First Assessed/Time First Assessed: 06/03/19 1606   Primary Wound Type: Open incision/surgical site  Location: Abdomen  Wound Location Orientation: Left   Dressing Status Old drainage   Dressing Type ABD pad   Non-staged Wound Description Full thickness   Shape irregular   Wound Length (cm) 1 cm   Wound Width (cm) 0.7 cm   Wound Depth (cm) 1.5 cm   Wound Volume (cm^3) 1.05 cm^3   Condition of Base Slough   Condition of Edges Open;Rolled/curled   Assessment Intact   Undermining (cm) 0.6 cm   Direction of Undermining 11 o'clock   Drainage Amount Moderate   Drainage Color Serosanguinous; Tan   Wound Odor None   Samantha-wound Assessment Intact   Margins Epibole (rolled edges)                                                                           Wound Abdomen Right   Date First Assessed/Time First Assessed: 05/14/19 1519   Present on Hospital Admission: Yes  Wound Approximate Age at First Assessment (Weeks): 4 weeks  Primary Wound Type: Open incision/surgical site  Location: Abdomen  Wound Location Orientation: Right   Dressing Status Old drainage   Dressing Type ABD pad   Non-staged Wound Description Full thickness   Shape irregular   Wound Length (cm) 3 cm   Wound Width (cm) 3 cm   Wound Depth (cm) 1.6 cm   Wound Volume (cm^3) 14.4 cm^3   Condition of Base Granulation;Slough; Adipose exposed   Condition of Edges Rolled/curled   Assessment Pink   Undermining (cm) 1.6 cm   Direction of Undermining 11 o'clock;4 o'clock;5 o'clock   Drainage Amount Moderate   Drainage Color Serosanguinous; Tan   Wound Odor None   Samantha-wound Assessment Intact   Margins Epibole (rolled edges)       Debridement: required today to promote wound healing    LEFT ABDOMINAL WALL  Wound Procedure Type Debridement- Surgical   Procedure Time Out 1525   Consent Obtained  Yes   Procedure Bleeding Moderate   Procedure Hemostasis  Pressure   Type of Tissue Removed  Devitalized  (and vitalized), dermis, epidermis, subcutaneous   Procedure Instrument  Curette   Procedure Pain Scale Numeric 0/10   Debridement Procedure Performed by Dr. Da Cordero   Post-Procedure Length (cm) 1 cm   Post-Procedure Width (cm) 0.7 cm   Post-Procedure Depth (cm) 1.7 cm   Post-Procedure Volume (cm^3) 1.19 cm^3   Post-Procedure Surface Area (cm^2) 0.7 cm^2   Procedure Tolerated Well     Right abdominal wall  Wound Procedure Type Debridement- Surgical   Procedure Time Out 1525   Consent Obtained  Yes   Procedure Bleeding Moderate   Procedure Hemostasis  Pressure   Type of Tissue Removed  Devitalized  (and vitalized), dermis, epidermis, subcutaneous   Procedure Instrument  Curette   Procedure Pain Scale Numeric 2/10   Debridement Procedure Performed by Dr. Da Cordero   Post-Procedure Length (cm) 3 cm   Post-Procedure Width (cm) 3 cm   Post-Procedure Depth (cm) 1.7 cm   Post-Procedure Volume (cm^3) 15.3 cm^3   Post-Procedure Surface Area (cm^2) 9 cm^2   Procedure Tolerated Fair       Infection: No    Assessment:    Patient Active Problem List   Diagnosis Code    Status post abdominoplasty Z98.890    Dehiscence of closure of subcutaneous tissue, initial encounter T81.31XA    Dehiscence of external surgical wound T81. 31XA    Wound infection T14. 8XXA, L08.9    MRSA infection A49.02    Pain, neuropathic M79.2       The patient continues to make significant progress. There is less undermining today. The wounds are smaller. There is no signs of infection. Plan: 1. Serial debridements. 2. Dressings  Cleansing and Cleansing Agents  Other (comment); Soap and water  (vashe)   Dressing Changed Changed/New   Dressing Type Applied Other (Comment)  (Jessie to UM, Mesalt, Exu-dry, Mepilex border)     3. RTC in 1 week.      Princess Hoskins MD

## 2019-06-27 NOTE — WOUND CARE
06/27/19 1558 Wound Abdomen Left Date First Assessed/Time First Assessed: 06/03/19 1606   Primary Wound Type: Open incision/surgical site  Location: Abdomen  Wound Location Orientation: Left Dressing Status Old drainage Dressing Type Absorptive Non-staged Wound Description Full thickness Shape irregular Wound Length (cm) 1 cm Wound Width (cm) 0.7 cm Wound Depth (cm) 1 cm Wound Volume (cm^3) 0.7 cm^3 Condition of Base Slough;Pink Condition of Edges Open;Rolled/curled Assessment Intact Drainage Amount Moderate Drainage Color Serosanguinous; Tan  
Wound Odor None Samantha-wound Assessment Intact Cleansing and Cleansing Agents  Soap and water;Dermal wound cleanser Dressing Changed Changed/New Dressing Type Applied Other (Comment) (Jessie, Mesalt, Mepilex border) Procedure Tolerated Well Wound Abdomen Right Date First Assessed/Time First Assessed: 05/14/19 1519   Present on Hospital Admission: Yes  Wound Approximate Age at First Assessment (Weeks): 4 weeks  Primary Wound Type: Open incision/surgical site  Location: Abdomen  Wound Location Orientation: Right Dressing Status Old drainage Dressing Type Absorptive Non-staged Wound Description Full thickness Shape irregular Wound Length (cm) 2 cm Wound Width (cm) 3 cm Wound Depth (cm) 1.3 cm Wound Volume (cm^3) 7.8 cm^3 Condition of Base Granulation;Slough Condition of Edges Open;Rolled/curled Assessment Pink;Red Undermining (cm) 1.3 cm Direction of Undermining 11 o'clock Drainage Amount Moderate Drainage Color Serosanguinous; Tan  
Wound Odor None Samantha-wound Assessment Intact Cleansing and Cleansing Agents  Soap and water;Dermal wound cleanser Dressing Changed Changed/New Dressing Type Applied Other (Comment) (Jessie to undermining, Mesalt, mepilex border) Procedure Tolerated Well

## 2019-06-27 NOTE — DISCHARGE INSTRUCTIONS
For Home Care/Self Care  Keep dressing dry and intact when bathing    Leave dressings in place until next visit    Patient to return for wound care in: [address] IF UNABLE TO [address]. Inspect your wounds, looking for signs of infection which may include the following:  Increase in redness  Red \"streaks\" from wound  Increase in swelling  Fever  Unusual odor  Change in the amount of wound drainage. Should you experience any significant changes in your wound(s) or have any questions regarding your home care instructions please contact the wound center or your home health company. If after regular business hours, please call your family doctor or local emergency room. Edema Control:   Elevate legs as much as possible. Avoid standing in one position for more than 10 minutes. Avoid setting with legs down. Do not cross legs while sitting. Off-Loading:     Frequent position changes. Do not cross legs while sitting. Shift weight every 20 minutes or more when sitting for prolonged periods of time.

## 2019-06-28 ENCOUNTER — HOSPITAL ENCOUNTER (EMERGENCY)
Age: 29
Discharge: HOME | End: 2019-06-28
Attending: EMERGENCY MEDICINE | Admitting: EMERGENCY MEDICINE
Payer: COMMERCIAL

## 2019-06-28 VITALS
HEIGHT: 66 IN | DIASTOLIC BLOOD PRESSURE: 93 MMHG | OXYGEN SATURATION: 100 % | WEIGHT: 220 LBS | BODY MASS INDEX: 35.36 KG/M2 | SYSTOLIC BLOOD PRESSURE: 135 MMHG | HEART RATE: 84 BPM | TEMPERATURE: 98.9 F | RESPIRATION RATE: 15 BRPM

## 2019-06-28 DIAGNOSIS — L02.415 ABSCESS OF RIGHT THIGH: Primary | ICD-10-CM

## 2019-06-28 PROCEDURE — 87077 CULTURE AEROBIC IDENTIFY: CPT

## 2019-06-28 PROCEDURE — 99282 EMERGENCY DEPT VISIT SF MDM: CPT

## 2019-06-28 PROCEDURE — 87186 SC STD MICRODIL/AGAR DIL: CPT

## 2019-06-28 PROCEDURE — 75810000289 HC I&D ABSCESS SIMP/COMP/MULT

## 2019-06-28 PROCEDURE — 77030019895 HC PCKNG STRP IODO -A

## 2019-06-28 PROCEDURE — 87070 CULTURE OTHR SPECIMN AEROBIC: CPT

## 2019-06-28 PROCEDURE — 74011000250 HC RX REV CODE- 250: Performed by: PHYSICIAN ASSISTANT

## 2019-06-28 RX ORDER — HYDROCODONE BITARTRATE AND ACETAMINOPHEN 5; 325 MG/1; MG/1
1 TABLET ORAL
Qty: 10 TAB | Refills: 0 | Status: SHIPPED | OUTPATIENT
Start: 2019-06-28 | End: 2019-07-01

## 2019-06-28 RX ORDER — LIDOCAINE HYDROCHLORIDE AND EPINEPHRINE 10; 10 MG/ML; UG/ML
1.5 INJECTION, SOLUTION INFILTRATION; PERINEURAL ONCE
Status: COMPLETED | OUTPATIENT
Start: 2019-06-28 | End: 2019-06-28

## 2019-06-28 RX ADMIN — LIDOCAINE HYDROCHLORIDE,EPINEPHRINE BITARTRATE 15 MG: 10; .01 INJECTION, SOLUTION INFILTRATION; PERINEURAL at 17:00

## 2019-06-28 NOTE — LETTER
[facility] [place] 
[facility] 
[address] [address] 
[phone] Work/School Note Date: 6/28/2019 To Whom It May concern: 
 
Brigitte Morton was seen and treated today in the emergency room by the following provider(s): 
Attending Provider: Elizabeth Dunaway MD 
Physician Assistant: TUTU Castro. Brigitte Morton may return to work on 7/1/19. Sincerely, TUTU Castillo

## 2019-06-28 NOTE — ED TRIAGE NOTES
Patient ambulatory into ER c/o bilateral thigh abscesses. Pt report she was hospitalized for MRSA infection and sepsis. Pt finished her oral antibiotics recently and noticed the abscesses shortly after.

## 2019-06-28 NOTE — DISCHARGE INSTRUCTIONS

## 2019-06-28 NOTE — ED PROVIDER NOTES
EMERGENCY DEPARTMENT HISTORY AND PHYSICAL EXAM    Date: 6/28/2019  Patient Name: Mya Giron    History of Presenting Illness     Chief Complaint   Patient presents with    Abscess         History Provided By: Patient    Chief Complaint: Abscesses to the right thigh    Additional History (Context):   5:12 PM  Mya Giron is a 33 y.o. female with PMHX of MRSA and hypothyroidism who presents to the emergency department C/O 3 abscesses to the right inner thigh. Associated sxs include pain, swelling and redness. Pt denies nausea, vomiting, diarrhea, and any other sxs or complaints. Patient was hospitalized in May 2019 for MRSA and sepsis. She was seen yesterday at urgent care and put on doxycycline but she reports the swelling is worse today and she feels like the abscesses on her right thigh need to be incised. She does not feel like she has had a fever or chills. She has been seeing Dr. Yunior Calzada at the [facility] wound care clinic. PCP: None    Current Facility-Administered Medications   Medication Dose Route Frequency Provider Last Rate Last Dose    lidocaine-EPINEPHrine (XYLOCAINE) 1 %-1:100,000 injection 15 mg  1.5 mL IntraDERMal ONCE Elesa Salt PA         Current Outpatient Medications   Medication Sig Dispense Refill    HYDROcodone-acetaminophen (NORCO) 5-325 mg per tablet Take 1 Tab by mouth every eight (8) hours as needed for Pain for up to 3 days. Max Daily Amount: 3 Tabs. 10 Tab 0    levothyroxine (SYNTHROID) 88 mcg tablet Take 88 mcg by mouth Daily (before breakfast).  gabapentin (NEURONTIN) 600 mg tablet Take 1 Tab by mouth nightly. Indications: Neuropathic Pain 14 Tab 1    collagenase (SANTYL) 250 unit/gram ointment Apply  to affected area daily. 15 g 0    magic mouthwash (PHILL) susp Take 5 mL by mouth every four (4) hours as needed.  Maalox, benadryl, lidocaine - NO NYSTATIN 120 mL 0    vit a-vit c-zinc-propolis (ZINC, WITH A AND C, LOZENGES) lozg Use lozenges as needed 30 Lozenge 0       Past History     Past Medical History:  Past Medical History:   Diagnosis Date    Hypothyroidism        Past Surgical History:  Past Surgical History:   Procedure Laterality Date    HX HERNIA REPAIR      IR REMOVE TUNL/CUFF DRAIN  5/29/2019       Family History:  History reviewed. No pertinent family history. Social History:  Social History     Tobacco Use    Smoking status: Never Smoker    Smokeless tobacco: Never Used   Substance Use Topics    Alcohol use: No     Frequency: Never    Drug use: Not Currently       Allergies:  No Known Allergies      Review of Systems   Review of Systems   Constitutional: Negative for activity change and unexpected weight change. HENT: Negative for congestion and ear pain. Respiratory: Negative for cough and shortness of breath. Cardiovascular: Negative for chest pain. Gastrointestinal: Negative for abdominal pain. Genitourinary: Negative for dysuria. Musculoskeletal: Negative for neck pain. Skin: Positive for color change. Negative for rash. abscess   Neurological: Negative for syncope and headaches. All other systems reviewed and are negative. Physical Exam     Vitals:    06/28/19 1636   BP: (!) 135/93   Pulse: 84   Resp: 15   Temp: 98.9 °F (37.2 °C)   SpO2: 100%   Weight: 99.8 kg (220 lb)   Height: 5' 6\" (1.676 m)     Physical Exam   Constitutional: She is oriented to person, place, and time. She appears well-developed and well-nourished. No distress. HENT:   Head: Normocephalic and atraumatic. Cardiovascular: Normal rate, regular rhythm and normal heart sounds. Exam reveals no gallop and no friction rub. No murmur heard. Pulmonary/Chest: Effort normal and breath sounds normal. No respiratory distress. She has no wheezes. Musculoskeletal: Normal range of motion. Legs:  3 abscesses on the right thigh all with erythema, swelling and fluctuance.   Tender   Neurological: She is alert and oriented to person, place, and time. Skin: Skin is warm and dry. No rash noted. She is not diaphoretic. Psychiatric: She has a normal mood and affect. Her behavior is normal.   Nursing note and vitals reviewed. Diagnostic Study Results     Labs -   No results found for this or any previous visit (from the past 12 hour(s)). Radiologic Studies -   No orders to display     CT Results  (Last 48 hours)    None        CXR Results  (Last 48 hours)    None          Medications given in the ED-  Medications   lidocaine-EPINEPHrine (XYLOCAINE) 1 %-1:100,000 injection 15 mg (has no administration in time range)         Medical Decision Making   I am the first provider for this patient. I reviewed the vital signs, available nursing notes, past medical history, past surgical history, family history and social history. Vital Signs-Reviewed the patient's vital signs. Pulse Oximetry Analysis - 100% on RA     Records Reviewed: Nursing Notes and Old Medical Records    Provider Notes (Medical Decision Making): Patient with history of MRSA and recent hospitalization. She does not meet Sirs or sepsis criteria at this time. 3 abscesses on her right inner thigh have been incised and drained. We will have her continue doxycycline for antibiotics and follow-up here in 2 days for packing removal    Procedures:  I&D Abcess Simple  Date/Time: 6/28/2019 5:15 PM  Performed by: TUTU Linares  Authorized by: TUTU Linares     Consent:     Consent obtained:  Verbal    Consent given by:  Patient    Risks discussed:  Bleeding, incomplete drainage and pain    Alternatives discussed:  No treatment  Location:     Type:  Abscess    Location:  Lower extremity    Lower extremity location:  Leg    Leg location:  R upper leg  Pre-procedure details:     Skin preparation:  Betadine  Anesthesia (see MAR for exact dosages):      Anesthesia method:  Local infiltration    Local anesthetic:  Lidocaine 1% WITH epi  Procedure type: Complexity:  Simple  Procedure details:     Incision types:  Single straight    Incision depth:  Subcutaneous    Scalpel blade:  11    Wound management:  Probed and deloculated    Drainage:  Purulent    Drainage amount: Moderate    Packing materials:  1/2 in iodoform gauze  Post-procedure details:     Patient tolerance of procedure: Tolerated well, no immediate complications  Comments:      3 separate abscesses Incised and packed        ED Course:   5:12 PM Initial assessment performed. The patients presenting problems have been discussed, and they are in agreement with the care plan formulated and outlined with them. I have encouraged them to ask questions as they arise throughout their visit. Diagnosis and Disposition       DISCHARGE NOTE:  5:17 PM   [facility]  results have been reviewed with her. She has been counseled regarding her diagnosis, treatment, and plan. She verbally conveys understanding and agreement of the signs, symptoms, diagnosis, treatment and prognosis and additionally agrees to follow up as discussed. She also agrees with the care-plan and conveys that all of her questions have been answered. I have also provided discharge instructions for her that include: educational information regarding their diagnosis and treatment, and list of reasons why they would want to return to the ED prior to their follow-up appointment, should her condition change. She has been provided with education for proper emergency department utilization. CLINICAL IMPRESSION:    1. Abscess of right thigh        PLAN:  1. D/C Home  2. Current Discharge Medication List      START taking these medications    Details   HYDROcodone-acetaminophen (NORCO) 5-325 mg per tablet Take 1 Tab by mouth every eight (8) hours as needed for Pain for up to 3 days. Max Daily Amount: 3 Tabs. Qty: 10 Tab, Refills: 0    Associated Diagnoses: Abscess of right thigh           3.    Follow-up Information     Follow up With Specialties Details Why Contact Info    [facility] [place] [place] CL[facility]  Schedule an appointment as soon as possible for a visit in 1 week  [address]  [place], [address] [place] [address] [address]    [facility] EMERGENCY DEPT Emergency Medicine  If symptoms worsen [place] [address]  [phone]        _______________________________

## 2019-07-01 LAB
BACTERIA SPEC CULT: ABNORMAL
GRAM STN SPEC: ABNORMAL
SERVICE CMNT-IMP: ABNORMAL

## 2019-07-01 NOTE — CALL BACK NOTE
10:30 AM 
07/01/2019 On doxy for abscess. Wound culture grew MRSA. Resistant. Needs change to bactrim. Called patient. No answer. Left message to call ED back to discuss results.   
 
Luis Eduardo Mcdonald PA-C

## 2019-07-02 LAB
ACID FAST STN SPEC: NEGATIVE
MYCOBACTERIUM SPEC QL CULT: NEGATIVE
SPECIMEN PREPARATION: NORMAL
SPECIMEN SOURCE: NORMAL

## 2019-07-03 ENCOUNTER — HOSPITAL ENCOUNTER (OUTPATIENT)
Dept: WOUND CARE | Age: 29
Discharge: HOME | End: 2019-07-03
Attending: FAMILY MEDICINE
Payer: COMMERCIAL

## 2019-07-03 PROCEDURE — 99211 OFF/OP EST MAY X REQ PHY/QHP: CPT

## 2019-07-05 VITALS — HEART RATE: 80 BPM | TEMPERATURE: 98 F

## 2019-07-08 ENCOUNTER — HOSPITAL ENCOUNTER (OUTPATIENT)
Dept: WOUND CARE | Age: 29
Discharge: HOME | End: 2019-07-08
Attending: FAMILY MEDICINE
Payer: COMMERCIAL

## 2019-07-08 VITALS — OXYGEN SATURATION: 100 % | HEART RATE: 76 BPM | RESPIRATION RATE: 17 BRPM | TEMPERATURE: 98.1 F

## 2019-07-08 PROCEDURE — 11042 DBRDMT SUBQ TIS 1ST 20SQCM/<: CPT

## 2019-07-08 NOTE — WOUND CARE
07/08/19 1650 Wound Abdomen Left Date First Assessed/Time First Assessed: 06/03/19 1606   Primary Wound Type: Open incision/surgical site  Location: Abdomen  Wound Location Orientation: Left Dressing Status Old drainage Dressing Type Absorptive Non-staged Wound Description Full thickness Shape irregular Wound Length (cm) 0.6 cm Wound Width (cm) 0.6 cm Wound Depth (cm) 1.4 cm Wound Volume (cm^3) 0.5 cm^3 Condition of Base Slough;Granulation Condition of Edges Open;Rolled/curled Assessment Intact; Swelling Drainage Amount Moderate Drainage Color Serosanguinous; Tan  
Wound Odor None Samantha-wound Assessment Blanchable erythema Margins Epibole (rolled edges) Cleansing and Cleansing Agents  Soap and water;Dermal wound cleanser Dressing Changed Changed/New Dressing Type Applied Other (Comment) (Jessie, Iodoform packing, exu-dry, mepilex border) Wound Procedure Type Debridement- Surgical  
Procedure Time Out 4660 Consent Obtained  Yes Procedure Bleeding Minimal  
Procedure Hemostasis  Pressure Procedure Instrument  Curette Procedure Pain Scale Numeric 0/10 Debridement Procedure Performed by Dr. Thomas Angeles Post-Procedure Length (cm) 0.6 cm Post-Procedure Width (cm) 0.6 cm Post-Procedure Depth (cm) 1.5 cm Post-Procedure Volume (cm^3) 0.54 cm^3 Post-Procedure Surface Area (cm^2) 0.36 cm^2 Post Procedure Pain Scale Numeric 0/10 Procedure Tolerated Well Wound Abdomen Right Date First Assessed/Time First Assessed: 05/14/19 1519   Present on Hospital Admission: Yes  Wound Approximate Age at First Assessment (Weeks): 4 weeks  Primary Wound Type: Open incision/surgical site  Location: Abdomen  Wound Location Orientation: Right Dressing Status Old drainage Dressing Type Absorptive Non-staged Wound Description Full thickness Shape irregular Wound Length (cm) 2 cm Wound Width (cm) 2.1 cm Wound Depth (cm) 0.5 cm Wound Volume (cm^3) 2.1 cm^3 Condition of Base Slough;Granulation Condition of Edges Rolled/curled Assessment Edema;Painful Tunneling (cm) 1.5 cm Direction of Tunnel 12 o'clock;5 o'clock Drainage Amount Moderate Drainage Color Serosanguinous; Tan  
Wound Odor None Samantha-wound Assessment Edema;Blanchable erythema Margins Epibole (rolled edges) Cleansing and Cleansing Agents  Dermal wound cleanser; Soap and water Dressing Changed Changed/New Dressing Type Applied Other (Comment) (Jessie, Iodoform packing, Exu-dry, Mepilex border) Wound Procedure Type Debridement- Surgical  
Procedure Time Out 4197 Consent Obtained  Yes Procedure Bleeding Minimal  
Procedure Hemostasis  Pressure Procedure Instrument  Curette Procedure Pain Scale Numeric 0/10 Debridement Procedure Performed by Dr. Prasad Mills Post-Procedure Length (cm) 2 cm Post-Procedure Width (cm) 2.2 cm Post-Procedure Depth (cm) 0.5 cm Post-Procedure Volume (cm^3) 2.2 cm^3 Post-Procedure Surface Area (cm^2) 4.4 cm^2 Procedure Tolerated Well Post-debridement photo:

## 2019-07-08 NOTE — DISCHARGE INSTRUCTIONS
For Home Care/Self Care  Keep dressing dry and intact when bathing    Leave dressings in place until next visit    [facility]. Inspect your wounds, looking for signs of infection which may include the following:  Increase in redness  Red \"streaks\" from wound  Increase in swelling  Fever  Unusual odor  Change in the amount of wound drainage. Should you experience any significant changes in your wound(s) or have any questions regarding your home care instructions please contact the wound center or your home health company. If after regular business hours, please call your family doctor or local emergency room. Edema Control:   Elevate legs as much as possible. Avoid standing in one position for more than 10 minutes. Avoid setting with legs down. Do not cross legs while sitting. Off-Loading:     Frequent position changes. Do not cross legs while sitting. Shift weight every 20 minutes or more when sitting for prolonged periods of time.

## 2019-07-11 ENCOUNTER — APPOINTMENT (OUTPATIENT)
Dept: WOUND CARE | Age: 29
End: 2019-07-11
Attending: FAMILY MEDICINE
Payer: COMMERCIAL

## 2019-07-11 NOTE — PROGRESS NOTES
Patient presents to wound clinic for: Julia Aguilera is a 33 y.o. female whom presents for follow up of her post op wound complication. She has no new complaints. She has been doing well. Review of Systems:    Gen: No fever, chills, malaise, weight loss/gain. GI: No nausea, vomiting, diarrhea, constipation, melena or hematochezia. Derm: see below  Musc/skeletal: no bone or joint complains. Vasc: No edema, cyanosis or claudication. Exam:   Visit Vitals  Pulse 76   Temp 98.1 °F (36.7 °C)   Resp 17   SpO2 100%     Gen: Pleasant young female in NAD. Neuro: A+OX4, Motor grossly intact. Wound Description:     07/08/19 1650    Wound Abdomen Left   Date First Assessed/Time First Assessed: 06/03/19 1606   Primary Wound Type: Open incision/surgical site  Location: Abdomen  Wound Location Orientation: Left   Dressing Status Old drainage   Dressing Type Absorptive   Non-staged Wound Description Full thickness   Shape irregular   Wound Length (cm) 0.6 cm   Wound Width (cm) 0.6 cm   Wound Depth (cm) 1.4 cm   Wound Volume (cm^3) 0.5 cm^3   Condition of Base Slough;Granulation   Condition of Edges Open;Rolled/curled   Assessment Intact; Swelling   Drainage Amount Moderate   Drainage Color Serosanguinous; Tan   Wound Odor None   Samantha-wound Assessment Blanchable erythema   Margins Epibole (rolled edges)                                                                           Wound Abdomen Right   Date First Assessed/Time First Assessed: 05/14/19 1519   Present on Hospital Admission: Yes  Wound Approximate Age at First Assessment (Weeks): 4 weeks  Primary Wound Type: Open incision/surgical site  Location: Abdomen  Wound Location Orientation: Right   Dressing Status Old drainage   Dressing Type Absorptive   Non-staged Wound Description Full thickness   Shape irregular   Wound Length (cm) 2 cm   Wound Width (cm) 2.1 cm   Wound Depth (cm) 0.5 cm   Wound Volume (cm^3) 2.1 cm^3   Condition of Base Slough;Granulation Condition of Edges Rolled/curled   Assessment Edema;Painful   Tunneling (cm) 1.5 cm   Direction of Tunnel 12 o'clock;5 o'clock   Drainage Amount Moderate   Drainage Color Serosanguinous; Tan   Wound Odor None   Samantha-wound Assessment Edema;Blanchable erythema   Margins Epibole (rolled edges)       Debridement: required today to promote wound healing   Right Side Abdomen  Wound Procedure Type Debridement- Surgical   Procedure Time Out 1545   Consent Obtained  Yes   Procedure Bleeding Minimal   Procedure Hemostasis  Pressure   Procedure Instrument  Curette   Procedure Pain Scale Numeric 0/10   Debridement Procedure Performed by Dr. Greta Solis   Post-Procedure Length (cm) 2 cm   Post-Procedure Width (cm) 2.2 cm   Post-Procedure Depth (cm) 0.5 cm   Post-Procedure Volume (cm^3) 2.2 cm^3   Post-Procedure Surface Area (cm^2) 4.4 cm^2   Procedure Tolerated Well   Tissue removed - devitalized, dermis, epidermis, subcutaneous        Left side abdomen  Wound Procedure Type Debridement- Surgical   Procedure Time Out 1545   Consent Obtained  Yes   Procedure Bleeding Minimal   Procedure Hemostasis  Pressure   Procedure Instrument  Curette   Procedure Pain Scale Numeric 0/10   Debridement Procedure Performed by Dr. Greta Solis   Post-Procedure Length (cm) 0.6 cm   Post-Procedure Width (cm) 0.6 cm   Post-Procedure Depth (cm) 1.5 cm   Post-Procedure Volume (cm^3) 0.54 cm^3   Post-Procedure Surface Area (cm^2) 0.36 cm^2   Post Procedure Pain Scale Numeric 0/10   Procedure Tolerated Well   Tissue removed - devitalized, dermis, epidermis, subcutaneous      Infection: No    Assessment:    Patient Active Problem List   Diagnosis Code    Status post abdominoplasty Z98.890    Dehiscence of closure of subcutaneous tissue, initial encounter T81.31XA    Dehiscence of external surgical wound T81. 31XA    Wound infection T14. 8XXA, L08.9    MRSA infection A49.02    Pain, neuropathic M79.2     The patient continues to make remarkable progress. There is no sign of infection. Will continue the following. Plan: 1. Serial debridements. 2. Dressing changes:  Cleansing and Cleansing Agents  Soap and water;Dermal wound cleanser   Dressing Changed Changed/New   Dressing Type Applied Other (Comment)  (Jessie, Iodoform packing, exu-dry, mepilex border)   3. RTC in 1 week.      Constantin Livingston MD

## 2019-07-14 ENCOUNTER — HOSPITAL ENCOUNTER (EMERGENCY)
Age: 29
Discharge: HOME | End: 2019-07-14
Attending: EMERGENCY MEDICINE
Payer: COMMERCIAL

## 2019-07-14 VITALS
BODY MASS INDEX: 35.36 KG/M2 | HEART RATE: 82 BPM | WEIGHT: 220 LBS | HEIGHT: 66 IN | DIASTOLIC BLOOD PRESSURE: 95 MMHG | OXYGEN SATURATION: 100 % | RESPIRATION RATE: 16 BRPM | TEMPERATURE: 98.8 F | SYSTOLIC BLOOD PRESSURE: 144 MMHG

## 2019-07-14 DIAGNOSIS — L02.416 ABSCESS OF LEFT THIGH: Primary | ICD-10-CM

## 2019-07-14 PROCEDURE — 99282 EMERGENCY DEPT VISIT SF MDM: CPT

## 2019-07-14 PROCEDURE — 74011250636 HC RX REV CODE- 250/636: Performed by: PHYSICIAN ASSISTANT

## 2019-07-14 PROCEDURE — 75810000289 HC I&D ABSCESS SIMP/COMP/MULT

## 2019-07-14 RX ORDER — HYDROCODONE BITARTRATE AND ACETAMINOPHEN 5; 325 MG/1; MG/1
1 TABLET ORAL
Qty: 10 TAB | Refills: 0 | Status: SHIPPED | OUTPATIENT
Start: 2019-07-14 | End: 2019-07-17

## 2019-07-14 RX ORDER — LIDOCAINE HYDROCHLORIDE 20 MG/ML
10 INJECTION, SOLUTION INFILTRATION; PERINEURAL
Status: COMPLETED | OUTPATIENT
Start: 2019-07-14 | End: 2019-07-14

## 2019-07-14 RX ORDER — SULFAMETHOXAZOLE AND TRIMETHOPRIM 800; 160 MG/1; MG/1
2 TABLET ORAL 2 TIMES DAILY
Qty: 40 TAB | Refills: 0 | Status: SHIPPED | OUTPATIENT
Start: 2019-07-14 | End: 2019-07-24

## 2019-07-14 RX ADMIN — LIDOCAINE HYDROCHLORIDE 200 MG: 20 INJECTION, SOLUTION INFILTRATION; PERINEURAL at 15:05

## 2019-07-14 NOTE — DISCHARGE INSTRUCTIONS

## 2019-07-14 NOTE — ED PROVIDER NOTES
EMERGENCY DEPARTMENT HISTORY AND PHYSICAL EXAM    Date: 7/14/2019  Patient Name: Adán Lieberman    History of Presenting Illness     Chief Complaint   Patient presents with    Skin Problem         History Provided By: Patient    Adán Lieberman is a 33 y.o. female with PMHX of abscess and MRSA who presents to the emergency department C/O left inner thigh abscess. Associated sxs include swelling left inner thigh. Patient reports history of MRSA and abscesses in the past she has had this particular abscess left inner thigh for a couple of days seems to be getting bigger and bigger no drainage. Pt denies fever drainage, and any other sxs or complaints. PCP: None    Current Facility-Administered Medications   Medication Dose Route Frequency Provider Last Rate Last Dose    lidocaine (XYLOCAINE) 20 mg/mL (2 %) injection 200 mg  10 mL IntraDERMal NOW Florencio Aviles PA         Current Outpatient Medications   Medication Sig Dispense Refill    trimethoprim-sulfamethoxazole (BACTRIM DS) 160-800 mg per tablet Take 2 Tabs by mouth two (2) times a day for 10 days. 40 Tab 0    levothyroxine (SYNTHROID) 88 mcg tablet Take 88 mcg by mouth Daily (before breakfast). Past History     Past Medical History:  Past Medical History:   Diagnosis Date    Hypothyroidism     MRSA (methicillin resistant staph aureus) culture positive        Past Surgical History:  Past Surgical History:   Procedure Laterality Date    HX HERNIA REPAIR      IR REMOVE TUNL/CUFF DRAIN  5/29/2019       Family History:  History reviewed. No pertinent family history. Social History:  Social History     Tobacco Use    Smoking status: Never Smoker    Smokeless tobacco: Never Used   Substance Use Topics    Alcohol use: No     Frequency: Never    Drug use: Not Currently       Allergies:  No Known Allergies      Review of Systems   Review of Systems   Constitutional: Negative for fever. Musculoskeletal: Positive for myalgias.    Skin: Abscess left thigh   All other systems reviewed and are negative. Physical Exam     Vitals:    07/14/19 1304   BP: (!) 144/95   Pulse: 82   Resp: 16   Temp: 98.8 °F (37.1 °C)   SpO2: 100%   Weight: 99.8 kg (220 lb)   Height: 5' 6\" (1.676 m)     Physical Exam   Constitutional: She is oriented to person, place, and time. She appears well-developed and well-nourished. No distress. HENT:   Head: Normocephalic and atraumatic. Eyes: Pupils are equal, round, and reactive to light. Conjunctivae and EOM are normal.   Neck: Normal range of motion. Neck supple. Cardiovascular: Normal rate and regular rhythm. Pulmonary/Chest: Effort normal and breath sounds normal.   Musculoskeletal: Normal range of motion. Neurological: She is alert and oriented to person, place, and time. Skin: Skin is warm and dry. Left inner thigh as shown wound with 3 cm swollen tender fluctuant area consistent with abscess   Psychiatric: She has a normal mood and affect. Her behavior is normal.   Nursing note and vitals reviewed. Diagnostic Study Results     Labs -   No results found for this or any previous visit (from the past 12 hour(s)). Radiologic Studies -   No orders to display     CT Results  (Last 48 hours)    None        CXR Results  (Last 48 hours)    None          Medications given in the ED-  Medications   lidocaine (XYLOCAINE) 20 mg/mL (2 %) injection 200 mg (has no administration in time range)         Medical Decision Making   I am the first provider for this patient. I reviewed the vital signs, available nursing notes, past medical history, past surgical history, family history and social history. Vital Signs-Reviewed the patient's vital signs.     Records Reviewed: Nursing Notes    Procedures:  I&D Abcess Simple  Date/Time: 7/14/2019 2:46 PM  Performed by: TUTU Porter  Authorized by: TUTU Porter     Consent:     Consent obtained:  Verbal    Consent given by:  Patient    Risks discussed:  Bleeding, incomplete drainage and pain    Alternatives discussed:  No treatment  Location:     Type:  Abscess    Location:  Lower extremity    Lower extremity location:  Leg    Leg location:  L upper leg  Pre-procedure details:     Skin preparation:  Betadine  Anesthesia (see MAR for exact dosages): Anesthesia method:  Local infiltration    Local anesthetic:  Lidocaine 2% w/o epi  Procedure type:     Complexity:  Simple  Procedure details:     Needle aspiration: no      Incision types:  Single straight    Incision depth:  Dermal    Scalpel blade:  11    Wound management:  Probed and deloculated    Drainage:  Bloody and purulent    Drainage amount: Moderate    Wound treatment:  Wound left open    Packing materials:  None  Post-procedure details:     Patient tolerance of procedure: Tolerated well, no immediate complications        ED Course:   2:25 PM   Initial assessment performed. The patients presenting problems have been discussed, and they are in agreement with the care plan formulated and outlined with them. I have encouraged them to ask questions as they arise throughout their visit. Discussion: 33 y.o. female uncomplicated left thigh abscess. Afebrile with appropriate vital signs simple I&D performed. Plan for Bactrim and PCP follow-up. Strict return precautions discussed. Diagnosis and Disposition       DISCHARGE NOTE:  Rosa Balbuena  results have been reviewed with her. She has been counseled regarding her diagnosis, treatment, and plan. She verbally conveys understanding and agreement of the signs, symptoms, diagnosis, treatment and prognosis and additionally agrees to follow up as discussed. She also agrees with the care-plan and conveys that all of her questions have been answered.   I have also provided discharge instructions for her that include: educational information regarding their diagnosis and treatment, and list of reasons why they would want to return to the ED prior to their follow-up appointment, should her condition change. She has been provided with education for proper emergency department utilization. CLINICAL IMPRESSION:    1. Abscess of left thigh        PLAN:  1. D/C Home  2. Current Discharge Medication List      START taking these medications    Details   trimethoprim-sulfamethoxazole (BACTRIM DS) 160-800 mg per tablet Take 2 Tabs by mouth two (2) times a day for 10 days. Qty: 40 Tab, Refills: 0           3. Follow-up Information     Follow up With Specialties Details Why Contact Info    your pcp  Schedule an appointment as soon as possible for a visit      [facility] EMERGENCY DEPT Emergency Medicine  As needed, If symptoms worsen [address] [id number]  [phone]    [id number] [facility]   for primary care follow up if you donot have health insurance [id number] [facility], [address]  [phone]              Please note that this dictation was completed with [employer], the computer voice recognition software. Quite often unanticipated grammatical, syntax, homophones, and other interpretive errors are inadvertently transcribed by the computer software. Please disregard these errors. Please excuse any errors that have escaped final proofreading.

## 2019-07-15 ENCOUNTER — HOSPITAL ENCOUNTER (OUTPATIENT)
Dept: WOUND CARE | Age: 29
Discharge: HOME | End: 2019-07-15
Attending: FAMILY MEDICINE
Payer: COMMERCIAL

## 2019-07-15 VITALS
TEMPERATURE: 98.2 F | SYSTOLIC BLOOD PRESSURE: 125 MMHG | DIASTOLIC BLOOD PRESSURE: 73 MMHG | RESPIRATION RATE: 18 BRPM | OXYGEN SATURATION: 100 % | HEART RATE: 77 BPM

## 2019-07-15 PROCEDURE — 99211 OFF/OP EST MAY X REQ PHY/QHP: CPT

## 2019-07-15 NOTE — WOUND CARE
07/15/19 1542 Wound Abdomen Left Date First Assessed/Time First Assessed: 06/03/19 1606   Primary Wound Type: Open incision/surgical site  Location: Abdomen  Wound Location Orientation: Left Dressing Status Removed; Other (Comment) (Paper towel, tape) Dressing Type Absorptive Non-staged Wound Description Full thickness Shape irregular Wound Length (cm) 0.4 cm Wound Width (cm) 0.3 cm Wound Depth (cm) 0.5 cm Wound Volume (cm^3) 0.06 cm^3 Condition of Base Granulation Condition of Edges Calloused; Rolled/curled Assessment Intact;Edema Drainage Amount Moderate Drainage Color Serosanguinous; Tan  
Wound Odor None Samantha-wound Assessment Intact; Other (Comment) (Hard) Cleansing and Cleansing Agents  Dermal wound cleanser; Soap and water Dressing Changed Changed/New Dressing Type Applied Other (Comment) (Jessie packing, Mepilex border) Procedure Tolerated Well Wound Abdomen Right Date First Assessed/Time First Assessed: 05/14/19 1519   Present on Hospital Admission: Yes  Wound Approximate Age at First Assessment (Weeks): 4 weeks  Primary Wound Type: Open incision/surgical site  Location: Abdomen  Wound Location Orientation: Right Dressing Status Old drainage Dressing Type Absorptive Non-staged Wound Description Full thickness Shape irregular Wound Length (cm) 1.8 cm Wound Width (cm) 2.1 cm Wound Depth (cm) 0.2 cm Wound Volume (cm^3) 0.76 cm^3 Condition of Base Slough;Granulation Condition of Edges Open;Rolled/curled Assessment Edema;Painful Tunneling (cm) 0.5 cm Direction of Tunnel 5 o'clock Drainage Amount Moderate Drainage Color Serosanguinous; Tan  
Wound Odor None Samantha-wound Assessment Other (Comment) (Hard ) Cleansing and Cleansing Agents  Dermal wound cleanser; Soap and water Dressing Changed Changed/New Dressing Type Applied Other (Comment) (Jessie, Aquacel Ag, Mepilex border) Procedure Tolerated Well

## 2019-07-15 NOTE — DISCHARGE INSTRUCTIONS
For Home Care/Self Care  Keep dressing dry and intact when bathing    Leave dressings in place until next visit    Patient to return for wound care in: [address] IF UNABLE TO 24 [facility]. Inspect your wounds, looking for signs of infection which may include the following:  Increase in redness  Red \"streaks\" from wound  Increase in swelling  Fever  Unusual odor  Change in the amount of wound drainage. Should you experience any significant changes in your wound(s) or have any questions regarding your home care instructions please contact the wound center or your home health company. If after regular business hours, please call your family doctor or local emergency room. Edema Control:   Elevate legs as much as possible. Avoid standing in one position for more than 10 minutes. Avoid setting with legs down. Do not cross legs while sitting. Off-Loading:     Frequent position changes. Do not cross legs while sitting. Shift weight every 20 minutes or more when sitting for prolonged periods of time.

## 2019-07-18 ENCOUNTER — HOSPITAL ENCOUNTER (OUTPATIENT)
Dept: WOUND CARE | Age: 29
Discharge: HOME | End: 2019-07-18
Attending: FAMILY MEDICINE
Payer: COMMERCIAL

## 2019-07-18 PROCEDURE — 11042 DBRDMT SUBQ TIS 1ST 20SQCM/<: CPT

## 2019-07-18 NOTE — WOUND CARE
07/18/19 1630   Wound Abdomen Left   Date First Assessed/Time First Assessed: 06/03/19 1606   Primary Wound Type: Open incision/surgical site  Location: Abdomen  Wound Location Orientation: Left   Dressing Status Intact; Old drainage   Dressing Type Absorptive   Non-staged Wound Description Full thickness   Shape irregular   Wound Length (cm) 0.4 cm   Wound Width (cm) 10.3 cm   Wound Depth (cm) 0.2 cm   Wound Volume (cm^3) 0.82 cm^3   Condition of Base Granulation   Condition of Edges Calloused; Rolled/curled   Assessment Edema; Intact   Drainage Amount Small   Drainage Color Serosanguinous   Wound Odor None   Samantha-wound Assessment Intact   Margins Epibole (rolled edges)   Cleansing and Cleansing Agents  Dermal wound cleanser   Dressing Changed Changed/New   Dressing Type Applied Other (Comment)  (manuela, bacitracin, mepitel border)   Wound Procedure Type Debridement- Surgical   Procedure Time Out 1615   Consent Obtained  Yes   Procedure Bleeding Minimal   Procedure Hemostasis  Pressure   Procedure Instrument  Curette   Procedure Pain Scale Numeric 0/10   Debridement Procedure Performed by Dr. Sisi Headley MD  (Suleiman Muñoz MD)   Post-Procedure Length (cm) 0.6 cm   Post-Procedure Width (cm) 0.6 cm   Post-Procedure Depth (cm) 1.5 cm   Post-Procedure Volume (cm^3) 0.54 cm^3   Post-Procedure Surface Area (cm^2) 0.36 cm^2   Post Procedure Pain Scale Numeric 0/10   Procedure Tolerated Well   Wound Abdomen Right   Date First Assessed/Time First Assessed: 05/14/19 1519   Present on Hospital Admission: Yes  Wound Approximate Age at First Assessment (Weeks): 4 weeks  Primary Wound Type: Open incision/surgical site  Location: Abdomen  Wound Location Orientation: Right   Dressing Status Old drainage   Dressing Type Absorptive   Non-staged Wound Description Full thickness   Shape irregular   Wound Length (cm) 1.8 cm   Wound Width (cm) 2.1 cm   Wound Depth (cm) 0.2 cm   Wound Volume (cm^3) 0.76 cm^3   Condition of Base Granulation;Slough   Condition of Edges Rolled/curled; Open   Assessment Edema;Painful   Tunneling (cm) 0.3 cm   Direction of Tunnel 5 o'clock   Drainage Amount Moderate   Drainage Color Serosanguinous; Tan   Wound Odor None   Samantha-wound Assessment Other (Comment)   Margins Epibole (rolled edges)   Cleansing and Cleansing Agents  Dermal wound cleanser   Dressing Changed Changed/New   Dressing Type Applied Other (Comment)   Wound Procedure Type Debridement- Surgical   Procedure Time Out 1615   Consent Obtained  Yes   Procedure Bleeding Minimal   Procedure Hemostasis  Pressure   Procedure Instrument  Curette   Procedure Pain Scale Numeric 0/10   Debridement Procedure Performed by Dr. Marco A Loredo MD   Post-Procedure Length (cm) 2 cm   Post-Procedure Width (cm) 2 cm   Post-Procedure Depth (cm) 0.5 cm   Post-Procedure Volume (cm^3) 2 cm^3   Post-Procedure Surface Area (cm^2) 4 cm^2   Procedure Tolerated Well

## 2019-07-19 NOTE — PROGRESS NOTES
Patient presents to wound clinic for: Donaldo Karimi is a 33 y.o. female whom presents follow up for her post op wound complications. The only new complaint is a boil that she had lanced in the ED a few days ago. The boil was on her left inner thigh. She is on ABX for this. Review of Systems:    Gen: No fever, chills, malaise, weight loss/gain. GI: No nausea, vomiting, diarrhea, constipation, melena or hematochezia. Derm: see below  Musc/skeletal: no bone or joint complains. Vasc: No edema, cyanosis or claudication. Exam:   Gen: pleasant female in NAD. NEuro: A+OX4, motor grossly intact. Thigh: I+D site looks good, no drainage, some induration and tenderness, no warmth or redness. Wound Description:     07/18/19 1630    Wound Abdomen Left   Date First Assessed/Time First Assessed: 06/03/19 1606   Primary Wound Type: Open incision/surgical site  Location: Abdomen  Wound Location Orientation: Left   Dressing Status Intact; Old drainage   Dressing Type Absorptive   Non-staged Wound Description Full thickness   Shape irregular   Wound Length (cm) 0.4 cm   Wound Width (cm) 10.3 cm   Wound Depth (cm) 0.2 cm   Wound Volume (cm^3) 0.82 cm^3   Condition of Base Granulation   Condition of Edges Calloused; Rolled/curled   Assessment Edema; Intact   Drainage Amount Small   Drainage Color Serosanguinous   Wound Odor None   Samantha-wound Assessment Intact   Margins Epibole (rolled edges)                                                                           Wound Abdomen Right   Date First Assessed/Time First Assessed: 05/14/19 1519   Present on Hospital Admission: Yes  Wound Approximate Age at First Assessment (Weeks): 4 weeks  Primary Wound Type: Open incision/surgical site  Location: Abdomen  Wound Location Orientation: Right   Dressing Status Old drainage   Dressing Type Absorptive   Non-staged Wound Description Full thickness   Shape irregular   Wound Length (cm) 1.8 cm   Wound Width (cm) 2.1 cm Wound Depth (cm) 0.2 cm   Wound Volume (cm^3) 0.76 cm^3   Condition of Base Granulation;Slough   Condition of Edges Rolled/curled; Open   Assessment Edema;Painful   Tunneling (cm) 0.3 cm   Direction of Tunnel 5 o'clock   Drainage Amount Moderate   Drainage Color Serosanguinous; Tan   Wound Odor None   Samantha-wound Assessment Other (Comment)   Margins Epibole (rolled edges)                 Debridement: required today to promote wound healing   Left:  Wound Procedure Type Debridement- Surgical   Procedure Time Out 1615   Consent Obtained  Yes   Procedure Bleeding Minimal   Procedure Hemostasis  Pressure   Procedure Instrument  Curette   Procedure Pain Scale Numeric 0/10   Debridement Procedure Performed by Dr. Roxanne Jarvis MD  (Carmel Alva MD)   Post-Procedure Length (cm) 0.6 cm   Post-Procedure Width (cm) 0.6 cm   Post-Procedure Depth (cm) 1.5 cm   Post-Procedure Volume (cm^3) 0.54 cm^3   Post-Procedure Surface Area (cm^2) 0.36 cm^2   Post Procedure Pain Scale Numeric 0/10   Procedure Tolerated Well     RIGHT:  Wound Procedure Type Debridement- Surgical   Procedure Time Out 1615   Consent Obtained  Yes   Procedure Bleeding Minimal   Procedure Hemostasis  Pressure   Procedure Instrument  Curette   Procedure Pain Scale Numeric 0/10   Debridement Procedure Performed by Dr. Roxanne Jarvis MD   Post-Procedure Length (cm) 2 cm   Post-Procedure Width (cm) 2 cm   Post-Procedure Depth (cm) 0.5 cm   Post-Procedure Volume (cm^3) 2 cm^3   Post-Procedure Surface Area (cm^2) 4 cm^2   Procedure Tolerated Well       Tissue removed: devitalized, dermis, epidermis, subcutaneous. Assessment:    Patient Active Problem List   Diagnosis Code    Status post abdominoplasty Z98.890    Dehiscence of closure of subcutaneous tissue, initial encounter T81.31XA    Dehiscence of external surgical wound T81. 31XA    Wound infection T14. 8XXA, L08.9    MRSA infection A49.02    Pain, neuropathic M79.2     The wounds continue to heal well. I suspect they will be closed within 4 weeks given no setbacks. Plan: 1. Serial debridements. 2. Dressings:  Cleansing and Cleansing Agents  Dermal wound cleanser   Dressing Changed Changed/New   Dressing Type Applied Other (Comment)  (manuela, bacitracin, mepitel border)     3. RTC in 1 week.     Heide Rolon MD

## 2019-07-22 ENCOUNTER — HOSPITAL ENCOUNTER (OUTPATIENT)
Dept: WOUND CARE | Age: 29
Discharge: HOME | End: 2019-07-22
Attending: FAMILY MEDICINE
Payer: COMMERCIAL

## 2019-07-22 ENCOUNTER — HOSPITAL ENCOUNTER (OUTPATIENT)
Dept: WOUND CARE | Age: 29
End: 2019-07-22
Attending: FAMILY MEDICINE
Payer: COMMERCIAL

## 2019-07-22 VITALS
DIASTOLIC BLOOD PRESSURE: 58 MMHG | RESPIRATION RATE: 16 BRPM | SYSTOLIC BLOOD PRESSURE: 129 MMHG | OXYGEN SATURATION: 100 % | TEMPERATURE: 98.4 F | HEART RATE: 68 BPM

## 2019-07-22 PROCEDURE — 17250 CHEM CAUT OF GRANLTJ TISSUE: CPT

## 2019-07-22 PROCEDURE — 99215 OFFICE O/P EST HI 40 MIN: CPT

## 2019-07-22 PROCEDURE — 99214 OFFICE O/P EST MOD 30 MIN: CPT

## 2019-07-22 NOTE — PROGRESS NOTES
Patient presents to wound clinic for: Marc Messina is a 33 y.o. female whom presents follow up for her post op wound complications. She has no complaints today. Review of Systems:    Gen: No fever, chills, malaise, weight loss/gain. GI: No nausea, vomiting, diarrhea, constipation, melena or hematochezia. Derm: see below  Musc/skeletal: no bone or joint complains. Vasc: No edema, cyanosis or claudication. Exam:   Gen: pleasant female in NAD. NEuro: A+OX4, motor grossly intact. Thigh: I+D site looks good, no drainage, some induration and tenderness, no warmth or redness. Wound Description:     Debridement: required today to promote wound healing   Left: Silver nitrate applied to open wounds. Assessment:    Patient Active Problem List   Diagnosis Code    Status post abdominoplasty Z98.890    Dehiscence of closure of subcutaneous tissue, initial encounter T81.31XA    Dehiscence of external surgical wound T81. 31XA    Wound infection T14. 8XXA, L08.9    MRSA infection A49.02    Pain, neuropathic M79.2     The wounds continue to heal well. I suspect they will be closed within 1 week. Plan: 1. No further debridements are expected to be done. 2. Dressings:  Cleansing and Cleansing Agents  Dermal wound cleanser   Dressing Changed Changed/New   Dressing Type Applied Other (Comment)  (manuela, bacitracin, mepitel border)     3. RTC in 1 week.     Monty Bazzi MD

## 2019-07-22 NOTE — WOUND CARE
07/22/19 1550   Wound Abdomen Left   Date First Assessed/Time First Assessed: 06/03/19 1606   Primary Wound Type: Open incision/surgical site  Location: Abdomen  Wound Location Orientation: Left   Dressing Status Intact   Dressing Type Absorptive   Wound Length (cm) 0 cm   Wound Width (cm) 0 cm   Wound Depth (cm) 0 cm   Wound Volume (cm^3) 0 cm^3   Condition of Base Granulation   Condition of Edges Closed   Drainage Amount None   Wound Odor None   Samantha-wound Assessment Intact   Cleansing and Cleansing Agents  Dermal wound cleanser   Dressing Changed Changed/New   Wound Abdomen Right   Date First Assessed/Time First Assessed: 05/14/19 1519   Present on Hospital Admission: Yes  Wound Approximate Age at First Assessment (Weeks): 4 weeks  Primary Wound Type: Open incision/surgical site  Location: Abdomen  Wound Location Orientation: Right   Dressing Status Breakthrough drainage   Dressing Type Absorptive   Non-staged Wound Description Partial thickness   Wound Length (cm) 0.2 cm   Wound Width (cm) 0.2 cm   Wound Depth (cm) 0.2 cm   Wound Volume (cm^3) 0.01 cm^3   Condition of Base Granulation   Condition of Edges Rolled/curled; Open   Tunneling (cm) 0.4 cm   Direction of Tunnel 5 o'clock   Drainage Amount Small   Drainage Color Serous   Wound Odor None   Samantha-wound Assessment Intact  (scarred)   Margins Epibole (rolled edges)   Cleansing and Cleansing Agents  Dermal wound cleanser   Dressing Changed Changed/New   Dressing Type Applied   (manuela,bacitracin,mepilex border)   Procedure Hemostasis  Silver Nitrate   Procedure Pain Scale Numeric 0/10   Procedure Tolerated Well

## 2019-07-25 ENCOUNTER — HOSPITAL ENCOUNTER (OUTPATIENT)
Dept: WOUND CARE | Age: 29
Discharge: HOME | End: 2019-07-25
Attending: FAMILY MEDICINE
Payer: COMMERCIAL

## 2019-07-25 VITALS
TEMPERATURE: 98 F | DIASTOLIC BLOOD PRESSURE: 84 MMHG | OXYGEN SATURATION: 100 % | HEART RATE: 69 BPM | SYSTOLIC BLOOD PRESSURE: 130 MMHG

## 2019-07-25 PROCEDURE — 99211 OFF/OP EST MAY X REQ PHY/QHP: CPT

## 2019-07-25 NOTE — WOUND CARE
07/25/19 1526   Wound Thigh Left;Medial red , hard 07/18/19   Date First Assessed/Time First Assessed: 07/18/19 1645   Present on Hospital Admission: Yes  Primary Wound Type: Abscess  Location: Thigh  Wound Location Orientation: Left;Medial  Wound Description: red , hard  Date of First Observation: 07/18/19   Dressing Status Intact   Dressing Type Absorptive   Incision Site Well Approximated No   Non-staged Wound Description Full thickness   Wound Length (cm) 0.8 cm   Wound Width (cm) 0.3 cm   Wound Depth (cm) 0.2 cm   Wound Volume (cm^3) 0.05 cm^3   Condition of Base Granulation   Condition of Edges Open   Assessment Drainage;Red;Swelling   Tissue Type Percent Red 100   Wound Abdomen Left   Date First Assessed/Time First Assessed: 06/03/19 1606   Primary Wound Type: Open incision/surgical site  Location: Abdomen  Wound Location Orientation: Left   Dressing Status Intact   Dressing Type Absorptive   Wound Length (cm) 0 cm   Wound Width (cm) 0 cm   Wound Depth (cm) 0 cm   Wound Volume (cm^3) 0 cm^3   Condition of Base Granulation   Condition of Edges Closed   Drainage Amount None   Wound Odor None   Samantha-wound Assessment Intact   Cleansing and Cleansing Agents  Dermal wound cleanser   Dressing Changed Changed/New   Wound Abdomen Right   Date First Assessed/Time First Assessed: 05/14/19 1519   Present on Hospital Admission: Yes  Wound Approximate Age at First Assessment (Weeks): 4 weeks  Primary Wound Type: Open incision/surgical site  Location: Abdomen  Wound Location Orientation: Right   Dressing Status Breakthrough drainage   Dressing Type Absorptive   Non-staged Wound Description Partial thickness   Wound Length (cm) 0.2 cm   Wound Width (cm) 0.2 cm   Wound Depth (cm) 0.1 cm   Wound Volume (cm^3) 0 cm^3   Condition of Base Granulation   Condition of Edges Rolled/curled; Open   Tunneling (cm) 0.4 cm   Direction of Tunnel 5 o'clock   Drainage Amount Small   Drainage Color Serous   Wound Odor None   Samantha-wound Assessment Intact   Margins Epibole (rolled edges)   Cleansing and Cleansing Agents  Dermal wound cleanser   Dressing Changed Changed/New   Dressing Type Applied Composites; Other (Comment)  (Aquacel ag, mepilex border)   Procedure Tolerated Well

## 2019-07-29 ENCOUNTER — HOSPITAL ENCOUNTER (EMERGENCY)
Age: 29
Discharge: HOME | End: 2019-07-29
Attending: EMERGENCY MEDICINE
Payer: COMMERCIAL

## 2019-07-29 VITALS
DIASTOLIC BLOOD PRESSURE: 81 MMHG | HEIGHT: 66 IN | TEMPERATURE: 99.2 F | HEART RATE: 81 BPM | OXYGEN SATURATION: 100 % | SYSTOLIC BLOOD PRESSURE: 119 MMHG | BODY MASS INDEX: 35.36 KG/M2 | WEIGHT: 220 LBS | RESPIRATION RATE: 14 BRPM

## 2019-07-29 DIAGNOSIS — Z86.14 PERSONAL HISTORY OF MRSA (METHICILLIN RESISTANT STAPHYLOCOCCUS AUREUS): ICD-10-CM

## 2019-07-29 DIAGNOSIS — L02.415 ABSCESS OF RIGHT THIGH: Primary | ICD-10-CM

## 2019-07-29 PROCEDURE — 99281 EMR DPT VST MAYX REQ PHY/QHP: CPT

## 2019-07-29 RX ORDER — TRAMADOL HYDROCHLORIDE 50 MG/1
50 TABLET ORAL
Qty: 20 TAB | Refills: 0 | Status: SHIPPED | OUTPATIENT
Start: 2019-07-29 | End: 2019-08-01

## 2019-07-29 RX ORDER — CLINDAMYCIN HYDROCHLORIDE 300 MG/1
300 CAPSULE ORAL 3 TIMES DAILY
Qty: 21 CAP | Refills: 0 | Status: SHIPPED | OUTPATIENT
Start: 2019-07-29 | End: 2019-08-05

## 2019-07-30 NOTE — DISCHARGE INSTRUCTIONS

## 2019-07-30 NOTE — ED PROVIDER NOTES
EMERGENCY DEPARTMENT HISTORY AND PHYSICAL EXAM    Date: 7/29/2019  Patient Name: Marko Collier    History of Presenting Illness     Chief Complaint   Patient presents with    Skin Problem         History Provided By: Patient    Marko Collier is a 33 y.o. female with PMHX of MRSA frequent abscesses who presents to the emergency department C/O right thigh abscess. Patient reports frequent abscesses and MRSA at this particular abscess on her right thigh has been increasing in size of the last couple of days. Is attempted warm compresses and some sort of salve with no relief of symptoms. Patient states she is been on Bactrim several times in the past and does not seem to help her recurring abscesses. Pt denies fever or recent illness, and any other sxs or complaints. PCP: None    Current Outpatient Medications   Medication Sig Dispense Refill    mupirocin calcium (BACTROBAN NASAL) 2 % nasal ointment by Both Nostrils route two (2) times a day. 1 g 0    clindamycin (CLEOCIN) 300 mg capsule Take 1 Cap by mouth three (3) times daily for 7 days. 21 Cap 0    traMADol (ULTRAM) 50 mg tablet Take 1 Tab by mouth every six (6) hours as needed for Pain for up to 3 days. Max Daily Amount: 200 mg. 20 Tab 0    levothyroxine (SYNTHROID) 88 mcg tablet Take 88 mcg by mouth Daily (before breakfast). Past History     Past Medical History:  Past Medical History:   Diagnosis Date    Hypothyroidism     MRSA (methicillin resistant staph aureus) culture positive        Past Surgical History:  Past Surgical History:   Procedure Laterality Date    HX HERNIA REPAIR      IR REMOVE TUNL/CUFF DRAIN  5/29/2019       Family History:  History reviewed. No pertinent family history.     Social History:  Social History     Tobacco Use    Smoking status: Never Smoker    Smokeless tobacco: Never Used   Substance Use Topics    Alcohol use: No     Frequency: Never    Drug use: Not Currently       Allergies:  No Known Allergies      Review of Systems   Review of Systems   Constitutional: Negative for fever. Musculoskeletal: Positive for myalgias. Skin:        Abscess, MRSA   All other systems reviewed and are negative. Physical Exam     Vitals:    07/29/19 2145   BP: 119/81   Pulse: 81   Resp: 14   Temp: 99.2 °F (37.3 °C)   SpO2: 100%   Weight: 99.8 kg (220 lb)   Height: 5' 6\" (1.676 m)     Physical Exam   Constitutional: She is oriented to person, place, and time. She appears well-developed and well-nourished. No distress. HENT:   Head: Normocephalic and atraumatic. Eyes: Pupils are equal, round, and reactive to light. Conjunctivae and EOM are normal.   Neck: Normal range of motion. Neck supple. Musculoskeletal: Normal range of motion. Neurological: She is alert and oriented to person, place, and time. Skin: Skin is warm and dry. Lesion noted. Right inner thigh with 1 cm tender papules no fluctuance 2 cm of surrounding halo of erythema, no streaking no exudate expressible drainage or fluctuance noted   Psychiatric: She has a normal mood and affect. Her behavior is normal.   Nursing note and vitals reviewed. Diagnostic Study Results     Labs -   No results found for this or any previous visit (from the past 12 hour(s)). Radiologic Studies -   No orders to display     CT Results  (Last 48 hours)    None        CXR Results  (Last 48 hours)    None          Medications given in the ED-  Medications - No data to display      Medical Decision Making   I am the first provider for this patient. I reviewed the vital signs, available nursing notes, past medical history, past surgical history, family history and social history. Vital Signs-Reviewed the patient's vital signs. Records Reviewed: Nursing Notes    Procedures:  Procedures    ED Course:   10:19 PM   Initial assessment performed.  The patients presenting problems have been discussed, and they are in agreement with the care plan formulated and outlined with them. I have encouraged them to ask questions as they arise throughout their visit. Discussion: 33 y.o. female with history of MRSA and frequent abscesses complaining of right thigh abscess onset a few days. No I&D required at today's visit as there is no fluctuance, will plan for antibiotics and Bactroban in the nostrils with PCP and dermatology dermatology follow-up. Strict return precautions discussed. Diagnosis and Disposition       DISCHARGE NOTE:  Bhavesh Shock  results have been reviewed with her. She has been counseled regarding her diagnosis, treatment, and plan. She verbally conveys understanding and agreement of the signs, symptoms, diagnosis, treatment and prognosis and additionally agrees to follow up as discussed. She also agrees with the care-plan and conveys that all of her questions have been answered. I have also provided discharge instructions for her that include: educational information regarding their diagnosis and treatment, and list of reasons why they would want to return to the ED prior to their follow-up appointment, should her condition change. She has been provided with education for proper emergency department utilization. CLINICAL IMPRESSION:    1. Abscess of right thigh    2. Personal history of MRSA (methicillin resistant Staphylococcus aureus)        PLAN:  1. D/C Home  2. Current Discharge Medication List      START taking these medications    Details   mupirocin calcium (BACTROBAN NASAL) 2 % nasal ointment by Both Nostrils route two (2) times a day. Qty: 1 g, Refills: 0      clindamycin (CLEOCIN) 300 mg capsule Take 1 Cap by mouth three (3) times daily for 7 days. Qty: 21 Cap, Refills: 0      traMADol (ULTRAM) 50 mg tablet Take 1 Tab by mouth every six (6) hours as needed for Pain for up to 3 days. Max Daily Amount: 200 mg. Qty: 20 Tab, Refills: 0    Associated Diagnoses: Abscess of right thigh           3.    Follow-up Information Follow up With Specialties Details Why Contact Info    your pcp  Schedule an appointment as soon as possible for a visit      [facility] EMERGENCY DEPT Emergency Medicine  As needed, If symptoms worsen Vega Singh [id number]  [phone]    [id number] [place] [address]   for primary care follow up if you do not have health insurance [address] [employer], [address] [place] [address],[address]    [facility]  Call  Nisha  [phone]                  Please note that this dictation was completed with [employer], the [employer] voice recognition software. Quite often unanticipated grammatical, syntax, homophones, and other interpretive errors are inadvertently transcribed by the computer software. Please disregard these errors. Please excuse any errors that have escaped final proofreading.

## 2019-08-02 ENCOUNTER — HOSPITAL ENCOUNTER (OUTPATIENT)
Dept: WOUND CARE | Age: 29
Discharge: HOME | End: 2019-08-02
Attending: FAMILY MEDICINE
Payer: COMMERCIAL

## 2019-08-02 VITALS
HEART RATE: 70 BPM | OXYGEN SATURATION: 100 % | DIASTOLIC BLOOD PRESSURE: 59 MMHG | SYSTOLIC BLOOD PRESSURE: 126 MMHG | RESPIRATION RATE: 16 BRPM | TEMPERATURE: 98.6 F

## 2019-08-02 PROCEDURE — 11042 DBRDMT SUBQ TIS 1ST 20SQCM/<: CPT

## 2019-08-02 NOTE — WOUND CARE
08/02/19 1339 Wound Thigh Left;Medial red , hard 07/18/19 Date First Assessed/Time First Assessed: 07/18/19 1645   Present on Hospital Admission: Yes  Primary Wound Type: Abscess  Location: Thigh  Wound Location Orientation: Left;Medial  Wound Description: red , hard  Date of First Observation: 07/18/19 Dressing Status Intact Dressing Type Absorptive Incision Site Well Approximated No  
Non-staged Wound Description Full thickness Wound Length (cm) 0.3 cm Wound Width (cm) 0.3 cm Wound Depth (cm) 0.4 cm Wound Volume (cm^3) 0.04 cm^3 Condition of Base Granulation Condition of Edges Open Assessment Drainage Drainage Amount Scant Drainage Color Serous Wound Odor None Samantha-wound Assessment Intact Cleansing and Cleansing Agents  Dermal wound cleanser Dressing Changed Changed/New Dressing Type Applied  
(manuela,bacitracin,calcium alginate,mepilex border) Procedure Tolerated Well Wound Abdomen Right Date First Assessed/Time First Assessed: 05/14/19 1519   Present on Hospital Admission: Yes  Wound Approximate Age at First Assessment (Weeks): 4 weeks  Primary Wound Type: Open incision/surgical site  Location: Abdomen  Wound Location Orientation: Right Dressing Status Breakthrough drainage Dressing Type Absorptive Non-staged Wound Description Partial thickness Wound Length (cm) 0.2 cm Wound Width (cm) 0.2 cm Wound Depth (cm) 0.1 cm Wound Volume (cm^3) 0 cm^3 Condition of Base Granulation Condition of Edges Rolled/curled Tunneling (cm) 0.4 cm Direction of Tunnel 5 o'clock Drainage Amount Scant Drainage Color Serous Wound Odor None Samantha-wound Assessment Intact Margins Epibole (rolled edges) Cleansing and Cleansing Agents  Dermal wound cleanser Dressing Changed Changed/New Dressing Type Applied  
(manuela,bacitracin,calcium alginate) Procedure Tolerated Well

## 2019-08-05 ENCOUNTER — HOSPITAL ENCOUNTER (OUTPATIENT)
Dept: WOUND CARE | Age: 29
Discharge: HOME | End: 2019-08-05
Attending: FAMILY MEDICINE
Payer: COMMERCIAL

## 2019-08-05 VITALS
TEMPERATURE: 98.5 F | DIASTOLIC BLOOD PRESSURE: 76 MMHG | SYSTOLIC BLOOD PRESSURE: 130 MMHG | HEART RATE: 68 BPM | OXYGEN SATURATION: 100 % | RESPIRATION RATE: 17 BRPM

## 2019-08-05 PROCEDURE — 99211 OFF/OP EST MAY X REQ PHY/QHP: CPT

## 2019-08-05 NOTE — PROGRESS NOTES
Patient presents to wound clinic for: Leo  is a 33 y.o. female whom presents follow up for her post op wound complications. She has no complaints today.      Review of Systems:     Gen: No fever, chills, malaise, weight loss/gain. GI: No nausea, vomiting, diarrhea, constipation, melena or hematochezia. Derm: see below  Musc/skeletal: no bone or joint complains. Vasc: No edema, cyanosis or claudication.      Exam:   Visit Vitals  /59 (BP 1 Location: Left arm)   Pulse 70   Temp 98.6 °F (37 °C)   Resp 16   SpO2 100%   Breastfeeding? No       Gen: pleasant female in NAD. NEuro: A+OX4, motor grossly intact.     Wound Description:  08/02/19 1339    Wound Thigh Left;Medial red , hard 07/18/19   Date First Assessed/Time First Assessed: 07/18/19 1645   Present on Hospital Admission: Yes  Primary Wound Type: Abscess  Location: Thigh  Wound Location Orientation: Left;Medial  Wound Description: red , hard  Date of First Observation: 07/18/19   Dressing Status Intact   Dressing Type Absorptive   Incision Site Well Approximated No   Non-staged Wound Description Full thickness   Wound Length (cm) 0.3 cm   Wound Width (cm) 0.3 cm   Wound Depth (cm) 0.4 cm   Wound Volume (cm^3) 0.04 cm^3   Condition of Base Granulation   Condition of Edges Open   Assessment Drainage   Drainage Amount Scant   Drainage Color Serous   Wound Odor None   Samantha-wound Assessment Intact                   Wound Abdomen Right   Date First Assessed/Time First Assessed: 05/14/19 1519   Present on Hospital Admission: Yes  Wound Approximate Age at First Assessment (Weeks): 4 weeks  Primary Wound Type: Open incision/surgical site  Location: Abdomen  Wound Location Orientation: Right   Dressing Status Breakthrough drainage   Dressing Type Absorptive   Non-staged Wound Description Partial thickness   Wound Length (cm) 0.2 cm   Wound Width (cm) 0.2 cm   Wound Depth (cm) 0.1 cm   Wound Volume (cm^3) 0 cm^3   Condition of Base Granulation   Condition of Edges Rolled/curled   Tunneling (cm) 0.4 cm   Direction of Tunnel 5 o'clock   Drainage Amount Scant   Drainage Color Serous   Wound Odor None   Samantha-wound Assessment Intact   Margins Epibole (rolled edges)   Cleansing and Cleansing Agents  Dermal wound cleanser   Dressing Changed Changed/New   Dressing Type Applied    (manuela,bacitracin,calcium alginate)   Procedure Tolerated Well                       Debridement: required today to promote wound healing   Left:  Surgical debridement using  #11 blade to debride devitalized tissue, dermis, epidermis, and subcutaneous tissue.             Assessment:          Patient Active Problem List   Diagnosis Code    Status post abdominoplasty Z98.890    Dehiscence of closure of subcutaneous tissue, initial encounter T81.31XA    Dehiscence of external surgical wound T81. 31XA    Wound infection T14. 8XXA, L08.9    MRSA infection A49.02    Pain, neuropathic M79.2      The wounds continue to heal well. I suspect they will be closed within 1 week.     Plan: 1. No further debridements are expected to be done. 2. Dressings:  Cleansing and Cleansing Agents  Dermal wound cleanser   Dressing Changed Changed/New   Dressing Type Applied Other (Comment)  (manuela, bacitracin, mepitel border)      3. RTC in 1 week.     Yani Rivero MD  +

## 2019-08-05 NOTE — DISCHARGE INSTRUCTIONS
Discharge Instructions for  Tami Heard  [address], [facility]  [phone] Fax [phone]    NAME:  Julia Aguilera  YOB: 1990  MEDICAL RECORD NUMBER:  [id number]  DATE:  8/5/2019    Wound Cleansing:   Do not scrub or use excessive force. Cleanse wound prior to applying a clean dressing with:  [] Normal Saline [x] Keep Wound Dry in Shower    [] Wound Cleanser   [] Cleanse wound with Mild Soap & Water  [] May Shower at Discharge   [] Other:      Topical Treatments:  Do not apply lotions, creams, or ointments to wound bed unless directed. Dietary:  [x] Diet as tolerated: [] Calorie Diabetic Diet: [] No Added Salt:  [] Increase Protein: [] Other:   Activity:  [x] Activity as tolerated:  [] Patient has no activity restrictions     [] Strict Bedrest: [] Remain off Work:     [] May return to full duty work:                                   [] Return to work with restrictions:             Return Appointment:  [] Wound and dressing supply provider:   [] ECF or Home Healthcare:  [] Wound Assessment: [] Physician or NP scheduled for Wound Assessment:   [x] Return Appointment: 4 days  [] Ordered tests:      Electronically signed Arlette Brewer RN on 8/5/2019 at [address] [place] [address] 22: Should you experience any significant changes in your wound(s) or have questions about your wound care, please contact the [facility] Main at [address] [place] [address] 8:00 am - 4:30. If you need help with your wound outside these hours and cannot wait until we are again available, contact your PCP or go to the hospital emergency room. PLEASE NOTE: IF YOU ARE UNABLE TO OBTAIN WOUND SUPPLIES, CONTINUE TO USE THE SUPPLIES YOU HAVE AVAILABLE UNTIL YOU ARE ABLE TO REACH US. IT IS MOST IMPORTANT TO KEEP THE WOUND COVERED AT ALL TIMES.

## 2019-08-05 NOTE — WOUND CARE
08/05/19 1629 Wound Thigh Left;Medial red , hard 07/18/19 Date First Assessed/Time First Assessed: 07/18/19 1645   Present on Hospital Admission: Yes  Primary Wound Type: Abscess  Location: Thigh  Wound Location Orientation: Left;Medial  Wound Description: red , hard  Date of First Observation: 07/18/19 Dressing Status Intact Dressing Type Absorptive Non-staged Wound Description Full thickness Shape irregular Wound Length (cm) 0.2 cm Wound Width (cm) 0.2 cm Wound Depth (cm) 0.2 cm Wound Volume (cm^3) 0.01 cm^3 Condition of Base Granulation Condition of Edges Open Assessment Drainage Drainage Amount Small Drainage Color Serosanguinous Wound Odor None Samantha-wound Assessment Intact Cleansing and Cleansing Agents  Soap and water;Dermal wound cleanser Dressing Changed Changed/New Dressing Type Applied Other (Comment) (Jessie, Alginate, Mepilex border) Procedure Tolerated Well

## 2019-08-08 ENCOUNTER — HOSPITAL ENCOUNTER (OUTPATIENT)
Dept: WOUND CARE | Age: 29
Discharge: HOME | End: 2019-08-08
Attending: FAMILY MEDICINE
Payer: COMMERCIAL

## 2019-08-08 VITALS
OXYGEN SATURATION: 100 % | SYSTOLIC BLOOD PRESSURE: 130 MMHG | TEMPERATURE: 98.3 F | DIASTOLIC BLOOD PRESSURE: 77 MMHG | HEART RATE: 68 BPM

## 2019-08-08 PROCEDURE — 99213 OFFICE O/P EST LOW 20 MIN: CPT

## 2019-08-08 NOTE — WOUND CARE
08/08/19 1151 Wound Thigh Left;Medial red , hard 07/18/19 Date First Assessed/Time First Assessed: 07/18/19 1645   Present on Hospital Admission: Yes  Primary Wound Type: Abscess  Location: Thigh  Wound Location Orientation: Left;Medial  Wound Description: red , hard  Date of First Observation: 07/18/19 Dressing Status Intact Dressing Type Absorptive Non-staged Wound Description Full thickness Wound Length (cm) 0.1 cm Wound Width (cm) 0.1 cm Wound Depth (cm) 0.1 cm Wound Volume (cm^3) 0 cm^3 Condition of Base Granulation Condition of Edges Open Drainage Amount Scant Drainage Color Clear Wound Odor None Samantha-wound Assessment Intact Cleansing and Cleansing Agents  Dermal wound cleanser Dressing Changed Changed/New Dressing Type Applied Other (Comment) (bacitracin) Procedure Tolerated Well

## 2019-08-08 NOTE — DISCHARGE INSTRUCTIONS
Discharge Instructions for  Tami Heard  [address], [address]  [phone] Fax [phone]    NAME:  Roger Calvillo  YOB: 1990  MEDICAL RECORD NUMBER:  [id number]  DATE:  8/8/2019    Wound Cleansing:         Topical Treatments:       Dressings:           Wound Location ***   []ressings:           Wound Location ***    []  Other:    Avoid contact of tape with skin. [] Change dressing: [] Daily    [] Every Other Day [] Three times per week   [x] Once a week [] Do Not Change Dressing   [] Other:             Wound Location:      Compression:  Apply: [] Off-Loading:   []CDietary:  [x] Diet as tolerated: [] Calorie Diabetic Diet: [] No Added Salt:  [] Increase Protein: [] Other:   Activity:  [x] Activity as tolerated:  [] Patient has no activity restrictions     [] Strict Bedrest: [] Remain off Work:     [] May return to full duty work:                                   [] Return to work with restrictions:             Return Appointment:  [x] Wound and dressing supply provider:   [] ECF or Home Healthcare:  [x] Wound Assessment: [x] Physician or NP scheduled for Wound Assessment:   [x] Return Appointment: With ***  in  7 Week(s)  [] Ordered tests:      Electronically signed Elizabeth Jhaveri LPN on 3/2/8745 at 98:03 AM     [address] [place] [address] Information: Should you experience any significant changes in your wound(s) or have questions about your wound care, please contact the [address] [place] [address] at [address] [place] [address] 8:00 am - 4:30. If you need help with your wound outside these hours and cannot wait until we are again available, contact your PCP or go to the hospital emergency room. PLEASE NOTE: IF YOU ARE UNABLE TO OBTAIN WOUND SUPPLIES, CONTINUE TO USE THE SUPPLIES YOU HAVE AVAILABLE UNTIL YOU ARE ABLE TO REACH US. IT IS MOST IMPORTANT TO KEEP THE WOUND COVERED AT ALL TIMES.      Physician Signature:_______________________    Date: ___________ Time:  ____________  .

## 2019-08-12 ENCOUNTER — HOSPITAL ENCOUNTER (OUTPATIENT)
Dept: WOUND CARE | Age: 29
Discharge: HOME | End: 2019-08-12
Attending: FAMILY MEDICINE
Payer: COMMERCIAL

## 2019-08-12 VITALS
HEART RATE: 63 BPM | OXYGEN SATURATION: 100 % | SYSTOLIC BLOOD PRESSURE: 133 MMHG | RESPIRATION RATE: 17 BRPM | DIASTOLIC BLOOD PRESSURE: 76 MMHG | TEMPERATURE: 98.1 F

## 2019-08-12 PROCEDURE — 99214 OFFICE O/P EST MOD 30 MIN: CPT

## 2019-08-12 NOTE — DISCHARGE INSTRUCTIONS
Discharge Instructions for  Tami Heard  [address], [facility]  [phone] Fax [phone]    NAME:  Soha Silverman  YOB: 1990  MEDICAL RECORD NUMBER:  [id number]  DATE:  8/12/2019    Wound Cleansing:   Do not scrub or use excessive force. Cleanse wound prior to applying a clean dressing with:  [] Normal Saline [] Keep Wound Dry in Shower    [] Wound Cleanser   [] Cleanse wound with Mild Soap & Water  [x] May Shower at Discharge   [] Other:      Dietary:  [x] Diet as tolerated: [] Calorie Diabetic Diet: [] No Added Salt:  [] Increase Protein: [] Other:   Activity:  [x] Activity as tolerated:  [] Patient has no activity restrictions     [] Strict Bedrest: [] Remain off Work:     [] May return to full duty work:                                   [] Return to work with restrictions:             Return Appointment:  [] Wound and dressing supply provider:   [] ECF or Home Healthcare:  [] Wound Assessment: [] Physician or NP scheduled for Wound Assessment:   [x] Return Appointment: If complications arise  [] Ordered tests:      Electronically signed James Phan RN on 8/12/2019 at [facility]: Should you experience any significant changes in your wound(s) or have questions about your wound care, please contact the [address] [place] [address] at [address] [place] [address] 8:00 am - 4:30. If you need help with your wound outside these hours and cannot wait until we are again available, contact your PCP or go to the hospital emergency room. PLEASE NOTE: IF YOU ARE UNABLE TO OBTAIN WOUND SUPPLIES, CONTINUE TO USE THE SUPPLIES YOU HAVE AVAILABLE UNTIL YOU ARE ABLE TO REACH US. IT IS MOST IMPORTANT TO KEEP THE WOUND COVERED AT ALL TIMES.

## 2019-08-12 NOTE — PROGRESS NOTES
Patient presents to wound clinic for: Jeanne Dunaway is a 33 y.o. female whom presents follow up for her post op wound complications. She has no complaints today.      Review of Systems:     Gen: No fever, chills, malaise, weight loss/gain. GI: No nausea, vomiting, diarrhea, constipation, melena or hematochezia. Derm: see below  Musc/skeletal: no bone or joint complains. Vasc: No edema, cyanosis or claudication.      Exam:   Visit Vitals  /77   Pulse 68   Temp 98.3 °F (36.8 °C)   SpO2 100%       Gen: pleasant female in NAD. NEuro: A+OX4, motor grossly intact. Wound Description:     08/08/19 1151    Wound Thigh Left;Medial red , hard 07/18/19   Date First Assessed/Time First Assessed: 07/18/19 1645   Present on Hospital Admission: Yes  Primary Wound Type: Abscess  Location: Thigh  Wound Location Orientation: Left;Medial  Wound Description: red , hard  Date of First Observation: 07/18/19   Dressing Status Intact   Dressing Type Absorptive   Non-staged Wound Description Full thickness   Wound Length (cm) 0.1 cm   Wound Width (cm) 0.1 cm   Wound Depth (cm) 0.1 cm   Wound Volume (cm^3) 0 cm^3   Condition of Base Granulation   Condition of Edges Open   Drainage Amount Scant   Drainage Color Clear   Wound Odor None   Samantha-wound Assessment Intact   Cleansing and Cleansing Agents  Dermal wound cleanser     Debridement: not required today to promote wound healing      Assessment:          Patient Active Problem List   Diagnosis Code    Status post abdominoplasty Z98.890    Dehiscence of closure of subcutaneous tissue, initial encounter T81.31XA    Dehiscence of external surgical wound T81. 31XA    Wound infection T14. 8XXA, L08.9    MRSA infection A49.02    Pain, neuropathic M79.2      The wounds continue to heal well. I suspect they will be closed next week.     Plan: 1. No further debridements are expected to be done.     2. Dressings:  Dressing Changed Changed/New   Dressing Type Applied Other (Comment)  (bacitracin)   Procedure Tolerated Well     3. RTC in 1 week.     Samir Hale MD  +

## 2019-08-12 NOTE — WOUND CARE
08/12/19 0910 Wound Thigh Left;Medial red , hard 07/18/19 Date First Assessed/Time First Assessed: 07/18/19 1645   Present on Hospital Admission: Yes  Primary Wound Type: Abscess  Location: Thigh  Wound Location Orientation: Left;Medial  Wound Description: red , hard  Date of First Observation: 07/18/19 Dressing Status Removed Non-staged Wound Description Full thickness Wound Length (cm) 0 cm Wound Width (cm) 0 cm Wound Depth (cm) 0 cm Wound Volume (cm^3) 0 cm^3 Condition of Base Pink Condition of Edges Closed Drainage Amount None Wound Odor None Samantha-wound Assessment Intact Cleansing and Cleansing Agents  Soap and water Dressing Changed Changed/New Dressing Type Applied Open to air Procedure Tolerated Well

## 2019-08-12 NOTE — PROGRESS NOTES
Patient presents to wound clinic for: Komal Alfonso is a 33 y.o. female whom presents follow up for her post op wound complications. She has no complaints today.      Review of Systems:     Gen: No fever, chills, malaise, weight loss/gain. GI: No nausea, vomiting, diarrhea, constipation, melena or hematochezia. Derm: see below  Musc/skeletal: no bone or joint complains. Vasc: No edema, cyanosis or claudication.      Exam:   Visit Vitals  /76 (BP 1 Location: Left arm, BP Patient Position: At rest;Sitting)   Pulse 63   Temp 98.1 °F (36.7 °C)   Resp 17   SpO2 100%   Breastfeeding? No       Gen: pleasant female in NAD. NEuro: A+OX4, motor grossly intact. Wound Description: The wounds are completely healed with good skin coverage. Debridement: NOT required today.       Assessment:          Patient Active Problem List   Diagnosis Code    Status post abdominoplasty Z98.890    Dehiscence of closure of subcutaneous tissue, initial encounter T81.31XA    Dehiscence of external surgical wound T81. 31XA    Wound infection T14. 8XXA, L08.9    MRSA infection A49.02    Pain, neuropathic M79.2      The wounds are healed. Plan: 1. DC from clinic. No restrictions.      Nurys Rajan MD  +

## 2020-11-27 ENCOUNTER — HOSPITAL ENCOUNTER (EMERGENCY)
Age: 30
Discharge: HOME | End: 2020-11-27
Attending: EMERGENCY MEDICINE
Payer: COMMERCIAL

## 2020-11-27 VITALS
BODY MASS INDEX: 36.96 KG/M2 | HEART RATE: 73 BPM | WEIGHT: 230 LBS | DIASTOLIC BLOOD PRESSURE: 82 MMHG | SYSTOLIC BLOOD PRESSURE: 137 MMHG | OXYGEN SATURATION: 100 % | TEMPERATURE: 97.3 F | RESPIRATION RATE: 16 BRPM | HEIGHT: 66 IN

## 2020-11-27 DIAGNOSIS — L02.416 ABSCESS OF LEFT THIGH: Primary | ICD-10-CM

## 2020-11-27 PROCEDURE — 99282 EMERGENCY DEPT VISIT SF MDM: CPT

## 2020-11-27 RX ORDER — SULFAMETHOXAZOLE AND TRIMETHOPRIM 800; 160 MG/1; MG/1
1 TABLET ORAL 2 TIMES DAILY
Qty: 20 TAB | Refills: 0 | Status: SHIPPED | OUTPATIENT
Start: 2020-11-27 | End: 2020-12-07

## 2020-11-28 NOTE — ED TRIAGE NOTES
Patient reports to ED c/c multiple abscesses to right thigh. Pt states, \"they need to be lanced. \"

## 2020-11-28 NOTE — ED PROVIDER NOTES
EMERGENCY DEPARTMENT HISTORY AND PHYSICAL EXAM    Date: 11/27/2020  Patient Name: Nora Mercado    History of Presenting Illness     Chief Complaint   Patient presents with    Abscess         History Provided By: Patient    9:33 PM  Nora Mercado is a 31 y.o. female with PMHX of recurrent abscesses who presents to the emergency department C/O to painful abscesses to right medial proximal thigh x 3 days. Has been applying warm compresses without relief and presents today requesting possible I&D. Patient reports recurrent abscesses to medial thighs, buttocks and groin, but has not had any in over 6 months. States sometimes Bactrim, warm compresses and Hibiclens will treat them effectively. Pt denies fever, and any other sxs or complaints. PCP: Abel, MD Neli    Current Outpatient Medications   Medication Sig Dispense Refill    trimethoprim-sulfamethoxazole (Bactrim DS) 160-800 mg per tablet Take 1 Tab by mouth two (2) times a day for 10 days. 20 Tab 0    mupirocin calcium (BACTROBAN NASAL) 2 % nasal ointment by Both Nostrils route two (2) times a day. 1 g 0    levothyroxine (SYNTHROID) 88 mcg tablet Take 88 mcg by mouth Daily (before breakfast). Past History     Past Medical History:  Past Medical History:   Diagnosis Date    Hypothyroidism     MRSA (methicillin resistant staph aureus) culture positive        Past Surgical History:  Past Surgical History:   Procedure Laterality Date    HX HERNIA REPAIR      IR REMOVE TUNL/CUFF DRAIN  5/29/2019       Family History:  History reviewed. No pertinent family history. Social History:  Social History     Tobacco Use    Smoking status: Never Smoker    Smokeless tobacco: Never Used   Substance Use Topics    Alcohol use: No     Frequency: Never    Drug use: Not Currently       Allergies:  No Known Allergies      Review of Systems   Review of Systems   Constitutional: Negative for fever. Musculoskeletal: Positive for myalgias.    Skin: Positive for color change and rash. All other systems reviewed and are negative. Physical Exam     Vitals:    11/27/20 2120   BP: 137/82   Pulse: 73   Resp: 16   Temp: 97.3 °F (36.3 °C)   SpO2: 100%   Weight: 104.3 kg (230 lb)   Height: 5' 6\" (1.676 m)     Physical Exam  Vitals signs and nursing note reviewed. Constitutional:       General: She is not in acute distress. Appearance: Normal appearance. She is obese. Musculoskeletal:        Legs:    Skin:     General: Skin is warm and dry. Neurological:      General: No focal deficit present. Mental Status: She is alert and oriented to person, place, and time. Psychiatric:         Mood and Affect: Mood normal.         Behavior: Behavior normal.               Diagnostic Study Results     Labs -   No results found for this or any previous visit (from the past 12 hour(s)). Radiologic Studies -   No orders to display     CT Results  (Last 48 hours)    None        CXR Results  (Last 48 hours)    None          Medications given in the ED-  Medications - No data to display      Medical Decision Making   I am the first provider for this patient. I reviewed the vital signs, available nursing notes, past medical history, past surgical history, family history and social history. Vital Signs-Reviewed the patient's vital signs. Records Reviewed: Nursing Notes      Procedures:  Procedures    ED Course:  9:33 PM   Initial assessment performed. The patients presenting problems have been discussed, and they are in agreement with the care plan formulated and outlined with them. I have encouraged them to ask questions as they arise throughout their visit. Provider Notes (Medical Decision Making): Fabiola Mike is a 31 y.o. female presents with 2 small abscesses to left medial thigh. She states the more distal abscess is more painful, but is firm and not ready for I&D at this time.   For that reason she does not want the more proximal abscess incised and agrees with the continued warm compresses and antibiotics. She specifically requested Bactrim as she has had most success with that in the past.  Return precautions were discussed and referral to general surgery given if patient continues to have recurrent abscesses. Diagnosis and Disposition       DISCHARGE NOTE:    Arabella Hicks  results have been reviewed with her. She has been counseled regarding her diagnosis, treatment, and plan. She verbally conveys understanding and agreement of the signs, symptoms, diagnosis, treatment and prognosis and additionally agrees to follow up as discussed. She also agrees with the care-plan and conveys that all of her questions have been answered. I have also provided discharge instructions for her that include: educational information regarding their diagnosis and treatment, and list of reasons why they would want to return to the ED prior to their follow-up appointment, should her condition change. She has been provided with education for proper emergency department utilization. CLINICAL IMPRESSION:    1. Abscess of left thigh        PLAN:  1. D/C Home  2. Current Discharge Medication List      START taking these medications    Details   trimethoprim-sulfamethoxazole (Bactrim DS) 160-800 mg per tablet Take 1 Tab by mouth two (2) times a day for 10 days. Qty: 20 Tab, Refills: 0           3. Follow-up Information     Follow up With Specialties Details Why Contact Info    Renetta Stein, DO Colon and Rectal Surgery  As needed [address] [place] [id number] [phone]      [facility] EMERGENCY DEPT Emergency Medicine  As needed, If symptoms worsen [address] [address] [phone]        _______________________________      Please note that this dictation was completed with [employer], the [employer] voice recognition software.   Quite often unanticipated grammatical, syntax, homophones, and other interpretive errors are inadvertently transcribed by the computer software. Please disregard these errors. Please excuse any errors that have escaped final proofreading.

## 2020-11-28 NOTE — DISCHARGE INSTRUCTIONS

## 2023-10-11 NOTE — DISCHARGE INSTRUCTIONS
Change  dressings prn,     Patient to return for wound care in: 4 Days     PLEASE CONTACT OFFICE AS SOON AS POSSIBLE IF UNABLE TO MAKE THIS APPOINTMENT. Inspect your wounds, looking for signs of infection which may include the following:  Increase in redness  Red \"streaks\" from wound  Increase in swelling  Fever  Unusual odor  Change in the amount of wound drainage. Should you experience any significant changes in your wound(s) or have any questions regarding your home care instructions please contact the wound center or your home health company. If after regular business hours, please call your family doctor or local emergency room. Edema Control: Elevate legs as much as possible. Avoid standing in one position for more than 10 minutes. Avoid setting with legs down. Do not cross legs while sitting. Off-Loading:Frequent position changes. Do not cross legs while sitting. Shift weight every 20 minutes or more when sitting for prolonged periods of time.     07/22/19 0998 Olumiant Pregnancy And Lactation Text: Based on animal studies, Olumiant may cause embryo-fetal harm when administered to pregnant women.  The medication should not be used in pregnancy.  Breastfeeding is not recommended during treatment.
